# Patient Record
Sex: MALE | Race: WHITE | NOT HISPANIC OR LATINO | Employment: FULL TIME | URBAN - METROPOLITAN AREA
[De-identification: names, ages, dates, MRNs, and addresses within clinical notes are randomized per-mention and may not be internally consistent; named-entity substitution may affect disease eponyms.]

---

## 2017-09-08 ENCOUNTER — HISTORICAL (OUTPATIENT)
Dept: INTERNAL MEDICINE | Facility: CLINIC | Age: 37
End: 2017-09-08

## 2017-09-08 LAB
ABS NEUT (OLG): 5.38 X10(3)/MCL (ref 2.1–9.2)
ALBUMIN SERPL-MCNC: 3.9 GM/DL (ref 3.4–5)
ALBUMIN/GLOB SERPL: 1 RATIO (ref 1–2)
ALP SERPL-CCNC: 74 UNIT/L (ref 45–117)
ALT SERPL-CCNC: 42 UNIT/L (ref 12–78)
APPEARANCE, UA: CLEAR
AST SERPL-CCNC: 25 UNIT/L (ref 15–37)
BACTERIA #/AREA URNS AUTO: ABNORMAL /[HPF]
BASOPHILS # BLD AUTO: 0.07 X10(3)/MCL
BASOPHILS NFR BLD AUTO: 1 % (ref 0–1)
BILIRUB SERPL-MCNC: 0.5 MG/DL (ref 0.2–1)
BILIRUB UR QL STRIP: NEGATIVE
BILIRUBIN DIRECT+TOT PNL SERPL-MCNC: 0.1 MG/DL
BILIRUBIN DIRECT+TOT PNL SERPL-MCNC: 0.4 MG/DL
BUN SERPL-MCNC: 16 MG/DL (ref 7–18)
CALCIUM SERPL-MCNC: 9 MG/DL (ref 8.5–10.1)
CHLORIDE SERPL-SCNC: 102 MMOL/L (ref 98–107)
CHOLEST SERPL-MCNC: 179 MG/DL
CHOLEST/HDLC SERPL: 3.9 {RATIO} (ref 0–5)
CO2 SERPL-SCNC: 33 MMOL/L (ref 21–32)
COLOR UR: YELLOW
CREAT SERPL-MCNC: 0.8 MG/DL (ref 0.6–1.3)
EOSINOPHIL # BLD AUTO: 0.14 X10(3)/MCL
EOSINOPHIL NFR BLD AUTO: 2 % (ref 0–5)
ERYTHROCYTE [DISTWIDTH] IN BLOOD BY AUTOMATED COUNT: 14.2 % (ref 11.5–14.5)
EST. AVERAGE GLUCOSE BLD GHB EST-MCNC: 169 MG/DL
GLOBULIN SER-MCNC: 4.3 GM/ML (ref 2.3–3.5)
GLUCOSE (UA): NORMAL
GLUCOSE SERPL-MCNC: 123 MG/DL (ref 74–106)
HBA1C MFR BLD: 7.5 % (ref 4.2–6.3)
HCT VFR BLD AUTO: 46.5 % (ref 40–51)
HDLC SERPL-MCNC: 46 MG/DL
HGB BLD-MCNC: 15.1 GM/DL (ref 13.5–17.5)
HGB UR QL STRIP: NEGATIVE
HYALINE CASTS #/AREA URNS LPF: ABNORMAL /[LPF]
IMM GRANULOCYTES # BLD AUTO: 0.05 10*3/UL
IMM GRANULOCYTES NFR BLD AUTO: 0 %
KETONES UR QL STRIP: NEGATIVE
LDLC SERPL CALC-MCNC: 109 MG/DL (ref 0–130)
LEUKOCYTE ESTERASE UR QL STRIP: NEGATIVE
LYMPHOCYTES # BLD AUTO: 2.51 X10(3)/MCL
LYMPHOCYTES NFR BLD AUTO: 27 % (ref 15–40)
MCH RBC QN AUTO: 29.1 PG (ref 26–34)
MCHC RBC AUTO-ENTMCNC: 32.5 GM/DL (ref 31–37)
MCV RBC AUTO: 89.6 FL (ref 80–100)
MONOCYTES # BLD AUTO: 1.09 X10(3)/MCL
MONOCYTES NFR BLD AUTO: 12 % (ref 4–12)
NEUTROPHILS # BLD AUTO: 5.38 X10(3)/MCL
NEUTROPHILS NFR BLD AUTO: 58 X10(3)/MCL
NITRITE UR QL STRIP: NEGATIVE
PH UR STRIP: 7 [PH] (ref 4.5–8)
PLATELET # BLD AUTO: 443 X10(3)/MCL (ref 130–400)
PMV BLD AUTO: 9.2 FL (ref 7.4–10.4)
POTASSIUM SERPL-SCNC: 4 MMOL/L (ref 3.5–5.1)
PROT SERPL-MCNC: 8.2 GM/DL (ref 6.4–8.2)
PROT UR QL STRIP: 10 MG/DL
RBC # BLD AUTO: 5.19 X10(6)/MCL (ref 4.5–5.9)
RBC #/AREA URNS AUTO: ABNORMAL /[HPF]
SODIUM SERPL-SCNC: 141 MMOL/L (ref 136–145)
SP GR UR STRIP: 1.02 (ref 1–1.03)
SQUAMOUS #/AREA URNS LPF: ABNORMAL /[LPF]
TRIGL SERPL-MCNC: 122 MG/DL
TSH SERPL-ACNC: 1.46 MIU/L (ref 0.36–3.74)
UROBILINOGEN UR STRIP-ACNC: NORMAL
VLDLC SERPL CALC-MCNC: 24 MG/DL
WBC # SPEC AUTO: 9.2 X10(3)/MCL (ref 4.5–11)
WBC #/AREA URNS AUTO: ABNORMAL /HPF

## 2017-09-11 ENCOUNTER — HISTORICAL (OUTPATIENT)
Dept: INTERNAL MEDICINE | Facility: CLINIC | Age: 37
End: 2017-09-11

## 2017-09-11 LAB
EST. AVERAGE GLUCOSE BLD GHB EST-MCNC: 128 MG/DL
HAV IGM SERPL QL IA: NONREACTIVE
HBA1C MFR BLD: 6.1 % (ref 4.2–6.3)
HBV CORE IGM SERPL QL IA: NONREACTIVE
HBV SURFACE AG SERPL QL IA: NEGATIVE
HCV AB SERPL QL IA: NONREACTIVE
HIV 1+2 AB+HIV1 P24 AG SERPL QL IA: NONREACTIVE

## 2018-01-26 ENCOUNTER — HISTORICAL (OUTPATIENT)
Dept: INTERNAL MEDICINE | Facility: CLINIC | Age: 38
End: 2018-01-26

## 2018-01-26 LAB
ALBUMIN SERPL-MCNC: 4.1 GM/DL (ref 3.4–5)
ALBUMIN/GLOB SERPL: 1 RATIO (ref 1–2)
ALP SERPL-CCNC: 75 UNIT/L (ref 45–117)
ALT SERPL-CCNC: 49 UNIT/L (ref 12–78)
AST SERPL-CCNC: 27 UNIT/L (ref 15–37)
BILIRUB SERPL-MCNC: 0.5 MG/DL (ref 0.2–1)
BILIRUBIN DIRECT+TOT PNL SERPL-MCNC: 0.1 MG/DL
BILIRUBIN DIRECT+TOT PNL SERPL-MCNC: 0.4 MG/DL
BUN SERPL-MCNC: 16 MG/DL (ref 7–18)
CALCIUM SERPL-MCNC: 9.2 MG/DL (ref 8.5–10.1)
CHLORIDE SERPL-SCNC: 103 MMOL/L (ref 98–107)
CO2 SERPL-SCNC: 34 MMOL/L (ref 21–32)
CREAT SERPL-MCNC: 0.7 MG/DL (ref 0.6–1.3)
EST. AVERAGE GLUCOSE BLD GHB EST-MCNC: 140 MG/DL
GLOBULIN SER-MCNC: 4.1 GM/ML (ref 2.3–3.5)
GLUCOSE SERPL-MCNC: 120 MG/DL (ref 74–106)
HBA1C MFR BLD: 6.5 % (ref 4.2–6.3)
POTASSIUM SERPL-SCNC: 4.1 MMOL/L (ref 3.5–5.1)
PROT SERPL-MCNC: 8.2 GM/DL (ref 6.4–8.2)
SODIUM SERPL-SCNC: 142 MMOL/L (ref 136–145)

## 2018-04-25 ENCOUNTER — HISTORICAL (OUTPATIENT)
Dept: INTERNAL MEDICINE | Facility: CLINIC | Age: 38
End: 2018-04-25

## 2018-04-25 LAB
ALBUMIN SERPL-MCNC: 4 GM/DL (ref 3.4–5)
ALBUMIN/GLOB SERPL: 1 RATIO (ref 1–2)
ALP SERPL-CCNC: 86 UNIT/L (ref 45–117)
ALT SERPL-CCNC: 44 UNIT/L (ref 12–78)
AST SERPL-CCNC: 22 UNIT/L (ref 15–37)
BILIRUB SERPL-MCNC: 0.6 MG/DL (ref 0.2–1)
BILIRUBIN DIRECT+TOT PNL SERPL-MCNC: 0.2 MG/DL
BILIRUBIN DIRECT+TOT PNL SERPL-MCNC: 0.4 MG/DL
BUN SERPL-MCNC: 17 MG/DL (ref 7–18)
CALCIUM SERPL-MCNC: 9.2 MG/DL (ref 8.5–10.1)
CHLORIDE SERPL-SCNC: 104 MMOL/L (ref 98–107)
CO2 SERPL-SCNC: 32 MMOL/L (ref 21–32)
CREAT SERPL-MCNC: 0.8 MG/DL (ref 0.6–1.3)
CREAT UR-MCNC: 174 MG/DL
EST. AVERAGE GLUCOSE BLD GHB EST-MCNC: 140 MG/DL
GLOBULIN SER-MCNC: 4 GM/ML (ref 2.3–3.5)
GLUCOSE SERPL-MCNC: 118 MG/DL (ref 74–106)
HBA1C MFR BLD: 6.5 % (ref 4.2–6.3)
MICROALBUMIN UR-MCNC: 76.7 MG/L (ref 0–19)
MICROALBUMIN/CREAT RATIO PNL UR: 44.1 MCG/MG CR (ref 0–29)
POTASSIUM SERPL-SCNC: 4.3 MMOL/L (ref 3.5–5.1)
PROT SERPL-MCNC: 8 GM/DL (ref 6.4–8.2)
SODIUM SERPL-SCNC: 140 MMOL/L (ref 136–145)

## 2018-07-27 ENCOUNTER — HISTORICAL (OUTPATIENT)
Dept: INTERNAL MEDICINE | Facility: CLINIC | Age: 38
End: 2018-07-27

## 2018-07-27 LAB
ABS NEUT (OLG): 5.95 X10(3)/MCL (ref 2.1–9.2)
ALBUMIN SERPL-MCNC: 4.1 GM/DL (ref 3.4–5)
ALBUMIN/GLOB SERPL: 1 RATIO (ref 1–2)
ALP SERPL-CCNC: 79 UNIT/L (ref 45–117)
ALT SERPL-CCNC: 38 UNIT/L (ref 12–78)
APPEARANCE, UA: CLEAR
AST SERPL-CCNC: 18 UNIT/L (ref 15–37)
BACTERIA #/AREA URNS AUTO: ABNORMAL /[HPF]
BASOPHILS # BLD AUTO: 0.06 X10(3)/MCL
BASOPHILS NFR BLD AUTO: 1 %
BILIRUB SERPL-MCNC: 0.6 MG/DL (ref 0.2–1)
BILIRUB UR QL STRIP: NEGATIVE
BILIRUBIN DIRECT+TOT PNL SERPL-MCNC: 0.2 MG/DL
BILIRUBIN DIRECT+TOT PNL SERPL-MCNC: 0.4 MG/DL
BUN SERPL-MCNC: 15 MG/DL (ref 7–18)
CALCIUM SERPL-MCNC: 8.8 MG/DL (ref 8.5–10.1)
CHLORIDE SERPL-SCNC: 100 MMOL/L (ref 98–107)
CHOLEST SERPL-MCNC: 128 MG/DL
CHOLEST/HDLC SERPL: 3.2 {RATIO} (ref 0–5)
CO2 SERPL-SCNC: 32 MMOL/L (ref 21–32)
COLOR UR: YELLOW
CREAT SERPL-MCNC: 0.9 MG/DL (ref 0.6–1.3)
CREAT UR-MCNC: 153 MG/DL
EOSINOPHIL # BLD AUTO: 0.09 10*3/UL
EOSINOPHIL NFR BLD AUTO: 1 %
ERYTHROCYTE [DISTWIDTH] IN BLOOD BY AUTOMATED COUNT: 14.1 % (ref 11.5–14.5)
EST. AVERAGE GLUCOSE BLD GHB EST-MCNC: 137 MG/DL
GLOBULIN SER-MCNC: 3.8 GM/ML (ref 2.3–3.5)
GLUCOSE (UA): NORMAL
GLUCOSE SERPL-MCNC: 123 MG/DL (ref 74–106)
HBA1C MFR BLD: 6.4 % (ref 4.2–6.3)
HCT VFR BLD AUTO: 46.2 % (ref 40–51)
HDLC SERPL-MCNC: 40 MG/DL
HGB BLD-MCNC: 15 GM/DL (ref 13.5–17.5)
HGB UR QL STRIP: NEGATIVE
HIV 1+2 AB+HIV1 P24 AG SERPL QL IA: NONREACTIVE
HYALINE CASTS #/AREA URNS LPF: ABNORMAL /[LPF]
IMM GRANULOCYTES # BLD AUTO: 0.03 10*3/UL
IMM GRANULOCYTES NFR BLD AUTO: 0 %
KETONES UR QL STRIP: NEGATIVE
LDLC SERPL CALC-MCNC: 66 MG/DL (ref 0–130)
LEUKOCYTE ESTERASE UR QL STRIP: NEGATIVE
LYMPHOCYTES # BLD AUTO: 2.22 X10(3)/MCL
LYMPHOCYTES NFR BLD AUTO: 24 % (ref 13–40)
MCH RBC QN AUTO: 29.8 PG (ref 26–34)
MCHC RBC AUTO-ENTMCNC: 32.5 GM/DL (ref 31–37)
MCV RBC AUTO: 91.8 FL (ref 80–100)
MICROALBUMIN UR-MCNC: 31 MG/L (ref 0–19)
MICROALBUMIN/CREAT RATIO PNL UR: 20.3 MCG/MG CR (ref 0–29)
MONOCYTES # BLD AUTO: 1.01 X10(3)/MCL
MONOCYTES NFR BLD AUTO: 11 % (ref 4–12)
NEUTROPHILS # BLD AUTO: 5.95 X10(3)/MCL
NEUTROPHILS NFR BLD AUTO: 64 X10(3)/MCL
NITRITE UR QL STRIP: NEGATIVE
PH UR STRIP: 8.5 [PH] (ref 4.5–8)
PLATELET # BLD AUTO: 408 X10(3)/MCL (ref 130–400)
PMV BLD AUTO: 9.3 FL (ref 7.4–10.4)
POTASSIUM SERPL-SCNC: 4.4 MMOL/L (ref 3.5–5.1)
PROT SERPL-MCNC: 7.9 GM/DL (ref 6.4–8.2)
PROT UR QL STRIP: 20 MG/DL
RBC # BLD AUTO: 5.03 X10(6)/MCL (ref 4.5–5.9)
RBC #/AREA URNS AUTO: ABNORMAL /[HPF]
SODIUM SERPL-SCNC: 138 MMOL/L (ref 136–145)
SP GR UR STRIP: 1.02 (ref 1–1.03)
SQUAMOUS #/AREA URNS LPF: ABNORMAL /[LPF]
TRIGL SERPL-MCNC: 112 MG/DL
TSH SERPL-ACNC: 1.31 MIU/L (ref 0.36–3.74)
UROBILINOGEN UR STRIP-ACNC: NORMAL
VLDLC SERPL CALC-MCNC: 22 MG/DL
WBC # SPEC AUTO: 9.4 X10(3)/MCL (ref 4.5–11)
WBC #/AREA URNS AUTO: ABNORMAL /HPF

## 2018-11-20 ENCOUNTER — HISTORICAL (OUTPATIENT)
Dept: ADMINISTRATIVE | Facility: HOSPITAL | Age: 38
End: 2018-11-20

## 2018-11-20 LAB
ABS NEUT (OLG): 6.33 X10(3)/MCL (ref 2.1–9.2)
BASOPHILS # BLD AUTO: 0.07 X10(3)/MCL
BASOPHILS NFR BLD AUTO: 1 %
BUN SERPL-MCNC: 18 MG/DL (ref 7–18)
CALCIUM SERPL-MCNC: 8.9 MG/DL (ref 8.5–10.1)
CHLORIDE SERPL-SCNC: 102 MMOL/L (ref 98–107)
CO2 SERPL-SCNC: 32 MMOL/L (ref 21–32)
CREAT SERPL-MCNC: 0.8 MG/DL (ref 0.6–1.3)
CREAT/UREA NIT SERPL: 22
EOSINOPHIL # BLD AUTO: 0.1 X10(3)/MCL
EOSINOPHIL NFR BLD AUTO: 1 %
ERYTHROCYTE [DISTWIDTH] IN BLOOD BY AUTOMATED COUNT: 14.2 % (ref 11.5–14.5)
EST. AVERAGE GLUCOSE BLD GHB EST-MCNC: 134 MG/DL
GLUCOSE SERPL-MCNC: 83 MG/DL (ref 74–106)
HBA1C MFR BLD: 6.3 % (ref 4.2–6.3)
HCT VFR BLD AUTO: 46.5 % (ref 40–51)
HGB BLD-MCNC: 15 GM/DL (ref 13.5–17.5)
IMM GRANULOCYTES # BLD AUTO: 0.04 10*3/UL
IMM GRANULOCYTES NFR BLD AUTO: 0 %
LYMPHOCYTES # BLD AUTO: 2.69 X10(3)/MCL
LYMPHOCYTES NFR BLD AUTO: 26 % (ref 13–40)
MCH RBC QN AUTO: 29.8 PG (ref 26–34)
MCHC RBC AUTO-ENTMCNC: 32.3 GM/DL (ref 31–37)
MCV RBC AUTO: 92.3 FL (ref 80–100)
MONOCYTES # BLD AUTO: 1.12 X10(3)/MCL
MONOCYTES NFR BLD AUTO: 11 % (ref 4–12)
NEUTROPHILS # BLD AUTO: 6.33 X10(3)/MCL
NEUTROPHILS NFR BLD AUTO: 61 X10(3)/MCL
PLATELET # BLD AUTO: 430 X10(3)/MCL (ref 130–400)
PMV BLD AUTO: 9.2 FL (ref 7.4–10.4)
POTASSIUM SERPL-SCNC: 4.2 MMOL/L (ref 3.5–5.1)
RBC # BLD AUTO: 5.04 X10(6)/MCL (ref 4.5–5.9)
SODIUM SERPL-SCNC: 140 MMOL/L (ref 136–145)
WBC # SPEC AUTO: 10.4 X10(3)/MCL (ref 4.5–11)

## 2019-02-22 ENCOUNTER — HISTORICAL (OUTPATIENT)
Dept: INTERNAL MEDICINE | Facility: CLINIC | Age: 39
End: 2019-02-22

## 2019-02-22 LAB
ABS NEUT (OLG): 5.78 X10(3)/MCL (ref 2.1–9.2)
ALBUMIN SERPL-MCNC: 4.1 GM/DL (ref 3.4–5)
ALBUMIN/GLOB SERPL: 1.1 RATIO (ref 1.1–2)
ALP SERPL-CCNC: 71 UNIT/L (ref 45–117)
ALT SERPL-CCNC: 49 UNIT/L (ref 12–78)
APPEARANCE, UA: CLEAR
AST SERPL-CCNC: 23 UNIT/L (ref 15–37)
BACTERIA #/AREA URNS AUTO: ABNORMAL /[HPF]
BASOPHILS # BLD AUTO: 0.06 X10(3)/MCL
BASOPHILS NFR BLD AUTO: 1 %
BILIRUB SERPL-MCNC: 0.7 MG/DL (ref 0.2–1)
BILIRUB UR QL STRIP: ABNORMAL MG/DL
BILIRUBIN DIRECT+TOT PNL SERPL-MCNC: 0.2 MG/DL
BILIRUBIN DIRECT+TOT PNL SERPL-MCNC: 0.5 MG/DL
BUN SERPL-MCNC: 16 MG/DL (ref 7–18)
CALCIUM SERPL-MCNC: 9.2 MG/DL (ref 8.5–10.1)
CHLORIDE SERPL-SCNC: 102 MMOL/L (ref 98–107)
CHOLEST SERPL-MCNC: 142 MG/DL
CHOLEST/HDLC SERPL: 3 {RATIO} (ref 0–5)
CO2 SERPL-SCNC: 33 MMOL/L (ref 21–32)
COLOR UR: ABNORMAL
CREAT SERPL-MCNC: 0.8 MG/DL (ref 0.6–1.3)
CREAT UR-MCNC: 109 MG/DL
EOSINOPHIL # BLD AUTO: 0.1 10*3/UL
EOSINOPHIL NFR BLD AUTO: 1 %
ERYTHROCYTE [DISTWIDTH] IN BLOOD BY AUTOMATED COUNT: 14 % (ref 11.5–14.5)
EST. AVERAGE GLUCOSE BLD GHB EST-MCNC: 140 MG/DL
GLOBULIN SER-MCNC: 3.9 GM/ML (ref 2.3–3.5)
GLUCOSE (UA): ABNORMAL MG/DL
GLUCOSE SERPL-MCNC: 108 MG/DL (ref 74–106)
HBA1C MFR BLD: 6.5 % (ref 4.2–6.3)
HCT VFR BLD AUTO: 47.2 % (ref 40–51)
HDLC SERPL-MCNC: 47 MG/DL
HGB BLD-MCNC: 15 GM/DL (ref 13.5–17.5)
HGB UR QL STRIP: ABNORMAL MG/DL
HYALINE CASTS #/AREA URNS LPF: ABNORMAL /[LPF]
IMM GRANULOCYTES # BLD AUTO: 0.03 10*3/UL
IMM GRANULOCYTES NFR BLD AUTO: 0 %
KETONES UR QL STRIP: ABNORMAL MG/DL
LDLC SERPL CALC-MCNC: 77 MG/DL (ref 0–130)
LEUKOCYTE ESTERASE UR QL STRIP: ABNORMAL LEU/UL
LYMPHOCYTES # BLD AUTO: 2.73 X10(3)/MCL
LYMPHOCYTES NFR BLD AUTO: 28 % (ref 13–40)
MCH RBC QN AUTO: 29.4 PG (ref 26–34)
MCHC RBC AUTO-ENTMCNC: 31.8 GM/DL (ref 31–37)
MCV RBC AUTO: 92.5 FL (ref 80–100)
MICROALBUMIN UR-MCNC: 32.3 MG/L (ref 0–19)
MICROALBUMIN/CREAT RATIO PNL UR: 29.6 MCG/MG CR (ref 0–29)
MONOCYTES # BLD AUTO: 1.08 X10(3)/MCL
MONOCYTES NFR BLD AUTO: 11 % (ref 4–12)
NEUTROPHILS # BLD AUTO: 5.78 X10(3)/MCL
NEUTROPHILS NFR BLD AUTO: 59 X10(3)/MCL
NITRITE UR QL STRIP: ABNORMAL
PH UR STRIP: 8.5 [PH] (ref 4.5–8)
PLATELET # BLD AUTO: 423 X10(3)/MCL (ref 130–400)
PMV BLD AUTO: 9 FL (ref 7.4–10.4)
POTASSIUM SERPL-SCNC: 4.4 MMOL/L (ref 3.5–5.1)
PROT SERPL-MCNC: 8 GM/DL (ref 6.4–8.2)
PROT UR QL STRIP: 10 MG/DL
RBC # BLD AUTO: 5.1 X10(6)/MCL (ref 4.5–5.9)
RBC #/AREA URNS AUTO: ABNORMAL /[HPF]
SODIUM SERPL-SCNC: 138 MMOL/L (ref 136–145)
SP GR UR STRIP: 1.02 (ref 1–1.03)
SQUAMOUS #/AREA URNS LPF: ABNORMAL /[LPF]
TRIGL SERPL-MCNC: 90 MG/DL
TSH SERPL-ACNC: 0.91 MIU/L (ref 0.36–3.74)
UROBILINOGEN UR STRIP-ACNC: NORMAL MG/DL
VLDLC SERPL CALC-MCNC: 18 MG/DL
WBC # SPEC AUTO: 9.8 X10(3)/MCL (ref 4.5–11)
WBC #/AREA URNS AUTO: ABNORMAL /HPF

## 2019-02-25 ENCOUNTER — HISTORICAL (OUTPATIENT)
Dept: ADMINISTRATIVE | Facility: HOSPITAL | Age: 39
End: 2019-02-25

## 2019-03-20 ENCOUNTER — HISTORICAL (OUTPATIENT)
Dept: ADMINISTRATIVE | Facility: HOSPITAL | Age: 39
End: 2019-03-20

## 2019-06-21 ENCOUNTER — HISTORICAL (OUTPATIENT)
Dept: INTERNAL MEDICINE | Facility: CLINIC | Age: 39
End: 2019-06-21

## 2019-06-21 LAB
ABS NEUT (OLG): 6.06 X10(3)/MCL
BASOPHILS # BLD AUTO: 0.06 X10(3)/MCL
BASOPHILS NFR BLD AUTO: 1 %
BUN SERPL-MCNC: 18 MG/DL (ref 7–18)
CALCIUM SERPL-MCNC: 8.9 MG/DL (ref 8.5–10.1)
CHLORIDE SERPL-SCNC: 103 MMOL/L (ref 98–107)
CHOLEST SERPL-MCNC: 154 MG/DL
CHOLEST/HDLC SERPL: 3.4 {RATIO} (ref 0–5)
CO2 SERPL-SCNC: 31 MMOL/L (ref 21–32)
CREAT SERPL-MCNC: 0.8 MG/DL (ref 0.6–1.3)
CREAT/UREA NIT SERPL: 22
EOSINOPHIL # BLD AUTO: 0.14 X10(3)/MCL
EOSINOPHIL NFR BLD AUTO: 2 %
ERYTHROCYTE [DISTWIDTH] IN BLOOD BY AUTOMATED COUNT: 13.9 % (ref 11.5–14.5)
EST. AVERAGE GLUCOSE BLD GHB EST-MCNC: 137 MG/DL
GLUCOSE SERPL-MCNC: 124 MG/DL (ref 74–106)
HBA1C MFR BLD: 6.4 % (ref 4.2–6.3)
HCT VFR BLD AUTO: 48.6 % (ref 40–51)
HDLC SERPL-MCNC: 45 MG/DL
HGB BLD-MCNC: 15.4 GM/DL (ref 13.5–17.5)
HIV 1+2 AB+HIV1 P24 AG SERPL QL IA: NONREACTIVE
IMM GRANULOCYTES # BLD AUTO: 0.06 10*3/UL
IMM GRANULOCYTES NFR BLD AUTO: 1 %
LDLC SERPL CALC-MCNC: 95 MG/DL (ref 0–130)
LYMPHOCYTES # BLD AUTO: 2.1 X10(3)/MCL
LYMPHOCYTES NFR BLD AUTO: 22 % (ref 13–40)
MACROCYTES BLD QL SMEAR: NORMAL
MCH RBC QN AUTO: 29.9 PG (ref 26–34)
MCHC RBC AUTO-ENTMCNC: 31.7 GM/DL (ref 31–37)
MCV RBC AUTO: 94.4 FL (ref 80–100)
MONOCYTES # BLD AUTO: 1.05 X10(3)/MCL
MONOCYTES NFR BLD AUTO: 11 % (ref 4–12)
NEUTROPHILS # BLD AUTO: 6.06 X10(3)/MCL
NEUTROPHILS NFR BLD AUTO: 64 %
PLATELET # BLD AUTO: 372 X10(3)/MCL (ref 130–400)
PLATELET # BLD EST: ADEQUATE 10*3/UL
PMV BLD AUTO: 10.3 FL (ref 7.4–10.4)
POIKILOCYTOSIS BLD QL SMEAR: NORMAL
POLYCHROMASIA BLD QL SMEAR: NORMAL
POTASSIUM SERPL-SCNC: 4.3 MMOL/L (ref 3.5–5.1)
RBC # BLD AUTO: 5.15 X10(6)/MCL (ref 4.5–5.9)
RBC MORPH BLD: NORMAL
SODIUM SERPL-SCNC: 137 MMOL/L (ref 136–145)
TRIGL SERPL-MCNC: 72 MG/DL
VLDLC SERPL CALC-MCNC: 14 MG/DL
WBC # SPEC AUTO: 9.5 X10(3)/MCL (ref 4.5–11)

## 2019-06-24 ENCOUNTER — HISTORICAL (OUTPATIENT)
Dept: ADMINISTRATIVE | Facility: HOSPITAL | Age: 39
End: 2019-06-24

## 2019-12-06 ENCOUNTER — HISTORICAL (OUTPATIENT)
Dept: INTERNAL MEDICINE | Facility: CLINIC | Age: 39
End: 2019-12-06

## 2019-12-06 LAB
ABS NEUT (OLG): 5.18 X10(3)/MCL (ref 2.1–9.2)
BASOPHILS # BLD AUTO: 0.1 X10(3)/MCL (ref 0–0.2)
BASOPHILS NFR BLD AUTO: 1 %
BUN SERPL-MCNC: 13 MG/DL (ref 7–18)
CALCIUM SERPL-MCNC: 9.3 MG/DL (ref 8.5–10.1)
CHLORIDE SERPL-SCNC: 104 MMOL/L (ref 98–107)
CO2 SERPL-SCNC: 34 MMOL/L (ref 21–32)
CREAT SERPL-MCNC: 0.7 MG/DL (ref 0.6–1.3)
CREAT/UREA NIT SERPL: 19
EOSINOPHIL # BLD AUTO: 0.1 X10(3)/MCL (ref 0–0.9)
EOSINOPHIL NFR BLD AUTO: 1 %
ERYTHROCYTE [DISTWIDTH] IN BLOOD BY AUTOMATED COUNT: 13.8 % (ref 11.5–14.5)
EST. AVERAGE GLUCOSE BLD GHB EST-MCNC: 143 MG/DL
GLUCOSE SERPL-MCNC: 124 MG/DL (ref 74–106)
HBA1C MFR BLD: 6.6 % (ref 4.2–6.3)
HCT VFR BLD AUTO: 48.3 % (ref 40–51)
HGB BLD-MCNC: 14.9 GM/DL (ref 13.5–17.5)
IMM GRANULOCYTES # BLD AUTO: 0.03 10*3/UL
IMM GRANULOCYTES NFR BLD AUTO: 0 %
LYMPHOCYTES # BLD AUTO: 2.2 X10(3)/MCL (ref 0.6–4.6)
LYMPHOCYTES NFR BLD AUTO: 26 %
MCH RBC QN AUTO: 29.1 PG (ref 26–34)
MCHC RBC AUTO-ENTMCNC: 30.8 GM/DL (ref 31–37)
MCV RBC AUTO: 94.3 FL (ref 80–100)
MONOCYTES # BLD AUTO: 1.1 X10(3)/MCL (ref 0.1–1.3)
MONOCYTES NFR BLD AUTO: 13 %
NEUTROPHILS # BLD AUTO: 5.18 X10(3)/MCL (ref 2.1–9.2)
NEUTROPHILS NFR BLD AUTO: 59 %
PLATELET # BLD AUTO: 397 X10(3)/MCL (ref 130–400)
PMV BLD AUTO: 9.4 FL (ref 7.4–10.4)
POTASSIUM SERPL-SCNC: 4.4 MMOL/L (ref 3.5–5.1)
RBC # BLD AUTO: 5.12 X10(6)/MCL (ref 4.5–5.9)
SODIUM SERPL-SCNC: 140 MMOL/L (ref 136–145)
WBC # SPEC AUTO: 8.8 X10(3)/MCL (ref 4.5–11)

## 2019-12-18 ENCOUNTER — HISTORICAL (OUTPATIENT)
Dept: ADMINISTRATIVE | Facility: HOSPITAL | Age: 39
End: 2019-12-18

## 2020-02-12 ENCOUNTER — HISTORICAL (OUTPATIENT)
Dept: ADMINISTRATIVE | Facility: HOSPITAL | Age: 40
End: 2020-02-12

## 2020-06-09 ENCOUNTER — HISTORICAL (OUTPATIENT)
Dept: INTERNAL MEDICINE | Facility: CLINIC | Age: 40
End: 2020-06-09

## 2020-06-09 LAB
ABS NEUT (OLG): 5.85 X10(3)/MCL (ref 2.1–9.2)
ALBUMIN SERPL-MCNC: 3.7 GM/DL (ref 3.4–5)
ALBUMIN/GLOB SERPL: 0.9 RATIO (ref 1.1–2)
ALP SERPL-CCNC: 81 UNIT/L (ref 45–117)
ALT SERPL-CCNC: 34 UNIT/L (ref 12–78)
AST SERPL-CCNC: 16 UNIT/L (ref 15–37)
BASOPHILS # BLD AUTO: 0.1 X10(3)/MCL (ref 0–0.2)
BASOPHILS NFR BLD AUTO: 1 %
BILIRUB SERPL-MCNC: 0.3 MG/DL (ref 0.2–1)
BILIRUBIN DIRECT+TOT PNL SERPL-MCNC: <0.1 MG/DL (ref 0–0.2)
BILIRUBIN DIRECT+TOT PNL SERPL-MCNC: ABNORMAL MG/DL
BUN SERPL-MCNC: 15 MG/DL (ref 7–18)
CALCIUM SERPL-MCNC: 8.8 MG/DL (ref 8.5–10.1)
CHLORIDE SERPL-SCNC: 107 MMOL/L (ref 98–107)
CHOLEST SERPL-MCNC: 176 MG/DL
CHOLEST/HDLC SERPL: 4.3 {RATIO} (ref 0–5)
CO2 SERPL-SCNC: 30 MMOL/L (ref 21–32)
CREAT SERPL-MCNC: 0.7 MG/DL (ref 0.6–1.3)
CREAT UR-MCNC: 121 MG/DL
EOSINOPHIL # BLD AUTO: 0.4 X10(3)/MCL (ref 0–0.9)
EOSINOPHIL NFR BLD AUTO: 4 %
ERYTHROCYTE [DISTWIDTH] IN BLOOD BY AUTOMATED COUNT: 14 % (ref 11.5–14.5)
EST. AVERAGE GLUCOSE BLD GHB EST-MCNC: 140 MG/DL
GLOBULIN SER-MCNC: 4.2 GM/ML (ref 2.3–3.5)
GLUCOSE SERPL-MCNC: 136 MG/DL (ref 74–106)
HBA1C MFR BLD: 6.5 % (ref 4.2–6.3)
HCT VFR BLD AUTO: 46 % (ref 40–51)
HDLC SERPL-MCNC: 41 MG/DL (ref 40–59)
HGB BLD-MCNC: 14.4 GM/DL (ref 13.5–17.5)
IMM GRANULOCYTES # BLD AUTO: 0.02 10*3/UL
IMM GRANULOCYTES NFR BLD AUTO: 0 %
LDLC SERPL CALC-MCNC: 122 MG/DL
LYMPHOCYTES # BLD AUTO: 2.4 X10(3)/MCL (ref 0.6–4.6)
LYMPHOCYTES NFR BLD AUTO: 24 %
MCH RBC QN AUTO: 29.3 PG (ref 26–34)
MCHC RBC AUTO-ENTMCNC: 31.3 GM/DL (ref 31–37)
MCV RBC AUTO: 93.7 FL (ref 80–100)
MICROALBUMIN UR-MCNC: 29.7 MG/L (ref 0–19)
MICROALBUMIN/CREAT RATIO PNL UR: 24.5 MCG/MG CR (ref 0–29)
MONOCYTES # BLD AUTO: 1.1 X10(3)/MCL (ref 0.1–1.3)
MONOCYTES NFR BLD AUTO: 11 %
NEUTROPHILS # BLD AUTO: 5.85 X10(3)/MCL (ref 2.1–9.2)
NEUTROPHILS NFR BLD AUTO: 60 %
PLATELET # BLD AUTO: 409 X10(3)/MCL (ref 130–400)
PMV BLD AUTO: 9 FL (ref 7.4–10.4)
POTASSIUM SERPL-SCNC: 4.5 MMOL/L (ref 3.5–5.1)
PROT SERPL-MCNC: 7.9 GM/DL (ref 6.4–8.2)
RBC # BLD AUTO: 4.91 X10(6)/MCL (ref 4.5–5.9)
SODIUM SERPL-SCNC: 140 MMOL/L (ref 136–145)
TRIGL SERPL-MCNC: 65 MG/DL
TSH SERPL-ACNC: 1.24 MIU/L (ref 0.36–3.74)
VLDLC SERPL CALC-MCNC: 13 MG/DL
WBC # SPEC AUTO: 9.8 X10(3)/MCL (ref 4.5–11)

## 2020-07-02 ENCOUNTER — HISTORICAL (OUTPATIENT)
Dept: ADMINISTRATIVE | Facility: HOSPITAL | Age: 40
End: 2020-07-02

## 2020-07-04 LAB — FINAL CULTURE: NORMAL

## 2020-12-11 ENCOUNTER — HISTORICAL (OUTPATIENT)
Dept: INTERNAL MEDICINE | Facility: CLINIC | Age: 40
End: 2020-12-11

## 2020-12-11 LAB
APPEARANCE, UA: CLEAR
BACTERIA #/AREA URNS AUTO: ABNORMAL /HPF
BILIRUB UR QL STRIP: NEGATIVE
CHOLEST SERPL-MCNC: 160 MG/DL
CHOLEST/HDLC SERPL: 4 {RATIO} (ref 0–5)
COLOR UR: ABNORMAL
EST. AVERAGE GLUCOSE BLD GHB EST-MCNC: 162.8 MG/DL
GLUCOSE (UA): NEGATIVE
HBA1C MFR BLD: 7.3 %
HDLC SERPL-MCNC: 40 MG/DL (ref 35–60)
HGB UR QL STRIP: NEGATIVE
HYALINE CASTS #/AREA URNS LPF: ABNORMAL /LPF
KETONES UR QL STRIP: NEGATIVE
LDLC SERPL CALC-MCNC: 105 MG/DL (ref 50–140)
LEUKOCYTE ESTERASE UR QL STRIP: NEGATIVE
NITRITE UR QL STRIP: NEGATIVE
PH UR STRIP: 8 [PH] (ref 4.5–8)
PROT UR QL STRIP: 10 MG/DL
RBC #/AREA URNS AUTO: ABNORMAL /HPF
SP GR UR STRIP: 1.02 (ref 1–1.03)
SQUAMOUS #/AREA URNS LPF: ABNORMAL /LPF
TRIGL SERPL-MCNC: 76 MG/DL (ref 34–140)
UROBILINOGEN UR STRIP-ACNC: NORMAL
VLDLC SERPL CALC-MCNC: 15 MG/DL
WBC #/AREA URNS AUTO: ABNORMAL /HPF

## 2020-12-18 ENCOUNTER — HISTORICAL (OUTPATIENT)
Dept: ADMINISTRATIVE | Facility: HOSPITAL | Age: 40
End: 2020-12-18

## 2020-12-18 LAB
ABS NEUT (OLG): 6.35 X10(3)/MCL (ref 2.1–9.2)
ALBUMIN SERPL-MCNC: 4.3 GM/DL (ref 3.5–5)
ALBUMIN/GLOB SERPL: 1.3 RATIO (ref 1.1–2)
ALP SERPL-CCNC: 69 UNIT/L (ref 40–150)
ALT SERPL-CCNC: 37 UNIT/L (ref 0–55)
AST SERPL-CCNC: 22 UNIT/L (ref 5–34)
BASOPHILS # BLD AUTO: 0.1 X10(3)/MCL (ref 0–0.2)
BASOPHILS NFR BLD AUTO: 1 %
BILIRUB SERPL-MCNC: 0.6 MG/DL
BILIRUBIN DIRECT+TOT PNL SERPL-MCNC: 0.2 MG/DL (ref 0–0.5)
BILIRUBIN DIRECT+TOT PNL SERPL-MCNC: 0.4 MG/DL (ref 0–0.8)
BUN SERPL-MCNC: 12 MG/DL (ref 8.9–20.6)
CALCIUM SERPL-MCNC: 9.4 MG/DL (ref 8.4–10.2)
CHLORIDE SERPL-SCNC: 99 MMOL/L (ref 98–107)
CO2 SERPL-SCNC: 30 MMOL/L (ref 22–29)
CREAT SERPL-MCNC: 0.71 MG/DL (ref 0.73–1.18)
EOSINOPHIL # BLD AUTO: 0.1 X10(3)/MCL (ref 0–0.9)
EOSINOPHIL NFR BLD AUTO: 1 %
ERYTHROCYTE [DISTWIDTH] IN BLOOD BY AUTOMATED COUNT: 13.4 % (ref 11.5–14.5)
GLOBULIN SER-MCNC: 3.4 GM/DL (ref 2.4–3.5)
GLUCOSE SERPL-MCNC: 128 MG/DL (ref 74–100)
HCT VFR BLD AUTO: 49.1 % (ref 40–51)
HGB BLD-MCNC: 15.5 GM/DL (ref 13.5–17.5)
IMM GRANULOCYTES # BLD AUTO: 0.05 10*3/UL
IMM GRANULOCYTES NFR BLD AUTO: 0 %
LYMPHOCYTES # BLD AUTO: 2.2 X10(3)/MCL (ref 0.6–4.6)
LYMPHOCYTES NFR BLD AUTO: 22 %
MCH RBC QN AUTO: 29 PG (ref 26–34)
MCHC RBC AUTO-ENTMCNC: 31.6 GM/DL (ref 31–37)
MCV RBC AUTO: 91.9 FL (ref 80–100)
MONOCYTES # BLD AUTO: 1 X10(3)/MCL (ref 0.1–1.3)
MONOCYTES NFR BLD AUTO: 10 %
NEUTROPHILS # BLD AUTO: 6.35 X10(3)/MCL (ref 2.1–9.2)
NEUTROPHILS NFR BLD AUTO: 66 %
PLATELET # BLD AUTO: 385 X10(3)/MCL (ref 130–400)
PMV BLD AUTO: 10.1 FL (ref 7.4–10.4)
POTASSIUM SERPL-SCNC: 4.8 MMOL/L (ref 3.5–5.1)
PROT SERPL-MCNC: 7.7 GM/DL (ref 6.4–8.3)
RBC # BLD AUTO: 5.34 X10(6)/MCL (ref 4.5–5.9)
SODIUM SERPL-SCNC: 139 MMOL/L (ref 136–145)
WBC # SPEC AUTO: 9.7 X10(3)/MCL (ref 4.5–11)

## 2021-03-28 ENCOUNTER — HISTORICAL (OUTPATIENT)
Dept: ADMINISTRATIVE | Facility: HOSPITAL | Age: 41
End: 2021-03-28

## 2021-06-11 ENCOUNTER — HISTORICAL (OUTPATIENT)
Dept: INTERNAL MEDICINE | Facility: CLINIC | Age: 41
End: 2021-06-11

## 2021-06-11 LAB
ABS NEUT (OLG): 8.27 X10(3)/MCL (ref 2.1–9.2)
ALBUMIN SERPL-MCNC: 4 GM/DL (ref 3.5–5)
ALBUMIN/GLOB SERPL: 1.1 RATIO (ref 1.1–2)
ALP SERPL-CCNC: 87 UNIT/L (ref 40–150)
ALT SERPL-CCNC: 55 UNIT/L (ref 0–55)
APPEARANCE, UA: CLEAR
AST SERPL-CCNC: 26 UNIT/L (ref 5–34)
BACTERIA #/AREA URNS AUTO: ABNORMAL /HPF
BASOPHILS # BLD AUTO: 0.1 X10(3)/MCL (ref 0–0.2)
BASOPHILS NFR BLD AUTO: 1 %
BILIRUB SERPL-MCNC: 0.5 MG/DL
BILIRUB UR QL STRIP: NEGATIVE
BILIRUBIN DIRECT+TOT PNL SERPL-MCNC: 0.2 MG/DL (ref 0–0.5)
BILIRUBIN DIRECT+TOT PNL SERPL-MCNC: 0.3 MG/DL (ref 0–0.8)
BUN SERPL-MCNC: 15.4 MG/DL (ref 8.9–20.6)
CALCIUM SERPL-MCNC: 9.6 MG/DL (ref 8.4–10.2)
CHLORIDE SERPL-SCNC: 99 MMOL/L (ref 98–107)
CHOLEST SERPL-MCNC: 138 MG/DL
CHOLEST/HDLC SERPL: 4 {RATIO} (ref 0–5)
CO2 SERPL-SCNC: 30 MMOL/L (ref 22–29)
COLOR UR: YELLOW
CREAT SERPL-MCNC: 0.77 MG/DL (ref 0.73–1.18)
CREAT UR-MCNC: 125.4 MG/DL (ref 58–161)
EOSINOPHIL # BLD AUTO: 0.1 X10(3)/MCL (ref 0–0.9)
EOSINOPHIL NFR BLD AUTO: 1 %
ERYTHROCYTE [DISTWIDTH] IN BLOOD BY AUTOMATED COUNT: 13.7 % (ref 11.5–14.5)
EST. AVERAGE GLUCOSE BLD GHB EST-MCNC: 185.8 MG/DL
GLOBULIN SER-MCNC: 3.7 GM/DL (ref 2.4–3.5)
GLUCOSE (UA): NEGATIVE
GLUCOSE SERPL-MCNC: 180 MG/DL (ref 74–100)
HBA1C MFR BLD: 8.1 %
HCT VFR BLD AUTO: 49.1 % (ref 40–51)
HDLC SERPL-MCNC: 38 MG/DL (ref 35–60)
HGB BLD-MCNC: 15.5 GM/DL (ref 13.5–17.5)
HGB UR QL STRIP: NEGATIVE
HYALINE CASTS #/AREA URNS LPF: ABNORMAL /LPF
IMM GRANULOCYTES # BLD AUTO: 0.08 10*3/UL
IMM GRANULOCYTES NFR BLD AUTO: 1 %
KETONES UR QL STRIP: NEGATIVE
LDLC SERPL CALC-MCNC: 78 MG/DL (ref 50–140)
LEUKOCYTE ESTERASE UR QL STRIP: NEGATIVE
LYMPHOCYTES # BLD AUTO: 2.7 X10(3)/MCL (ref 0.6–4.6)
LYMPHOCYTES NFR BLD AUTO: 22 %
MCH RBC QN AUTO: 28.7 PG (ref 26–34)
MCHC RBC AUTO-ENTMCNC: 31.6 GM/DL (ref 31–37)
MCV RBC AUTO: 90.9 FL (ref 80–100)
MICROALBUMIN UR-MCNC: 30.6 MG/L
MICROALBUMIN/CREAT RATIO PNL UR: 24.4 MG/GM CR (ref 0–30)
MONOCYTES # BLD AUTO: 0.9 X10(3)/MCL (ref 0.1–1.3)
MONOCYTES NFR BLD AUTO: 8 %
NEUTROPHILS # BLD AUTO: 8.27 X10(3)/MCL (ref 2.1–9.2)
NEUTROPHILS NFR BLD AUTO: 68 %
NITRITE UR QL STRIP: NEGATIVE
NRBC BLD AUTO-RTO: 0 % (ref 0–0.2)
PH UR STRIP: 7 [PH] (ref 4.5–8)
PLATELET # BLD AUTO: 452 X10(3)/MCL (ref 130–400)
PMV BLD AUTO: 9.3 FL (ref 7.4–10.4)
POTASSIUM SERPL-SCNC: 4.4 MMOL/L (ref 3.5–5.1)
PROT SERPL-MCNC: 7.7 GM/DL (ref 6.4–8.3)
PROT UR QL STRIP: NEGATIVE
RBC # BLD AUTO: 5.4 X10(6)/MCL (ref 4.5–5.9)
RBC #/AREA URNS AUTO: ABNORMAL /HPF
SODIUM SERPL-SCNC: 139 MMOL/L (ref 136–145)
SP GR UR STRIP: 1.02 (ref 1–1.03)
SQUAMOUS #/AREA URNS LPF: ABNORMAL /LPF
TRIGL SERPL-MCNC: 112 MG/DL (ref 34–140)
TSH SERPL-ACNC: 1.13 UIU/ML (ref 0.35–4.94)
UROBILINOGEN UR STRIP-ACNC: NORMAL
VLDLC SERPL CALC-MCNC: 22 MG/DL
WBC # SPEC AUTO: 12.2 X10(3)/MCL (ref 4.5–11)
WBC #/AREA URNS AUTO: ABNORMAL /HPF

## 2021-07-12 ENCOUNTER — HISTORICAL (OUTPATIENT)
Dept: ADMINISTRATIVE | Facility: HOSPITAL | Age: 41
End: 2021-07-12

## 2021-09-16 ENCOUNTER — HISTORICAL (OUTPATIENT)
Dept: INTERNAL MEDICINE | Facility: CLINIC | Age: 41
End: 2021-09-16

## 2021-09-16 LAB
ABS NEUT (OLG): 6.95 X10(3)/MCL (ref 2.1–9.2)
BASOPHILS # BLD AUTO: 0.1 X10(3)/MCL (ref 0–0.2)
BASOPHILS NFR BLD AUTO: 1 %
BUN SERPL-MCNC: 14.6 MG/DL (ref 8.9–20.6)
CALCIUM SERPL-MCNC: 9.6 MG/DL (ref 8.4–10.2)
CHLORIDE SERPL-SCNC: 99 MMOL/L (ref 98–107)
CO2 SERPL-SCNC: 34 MMOL/L (ref 22–29)
CREAT SERPL-MCNC: 0.84 MG/DL (ref 0.73–1.18)
CREAT/UREA NIT SERPL: 17
EOSINOPHIL # BLD AUTO: 0.1 X10(3)/MCL (ref 0–0.9)
EOSINOPHIL NFR BLD AUTO: 1 %
ERYTHROCYTE [DISTWIDTH] IN BLOOD BY AUTOMATED COUNT: 14.3 % (ref 11.5–14.5)
EST. AVERAGE GLUCOSE BLD GHB EST-MCNC: 217.3 MG/DL
GLUCOSE SERPL-MCNC: 225 MG/DL (ref 74–100)
HBA1C MFR BLD: 9.2 %
HCT VFR BLD AUTO: 48.7 % (ref 40–51)
HGB BLD-MCNC: 15.3 GM/DL (ref 13.5–17.5)
IMM GRANULOCYTES # BLD AUTO: 0.11 10*3/UL
IMM GRANULOCYTES NFR BLD AUTO: 1 %
LYMPHOCYTES # BLD AUTO: 2.8 X10(3)/MCL (ref 0.6–4.6)
LYMPHOCYTES NFR BLD AUTO: 25 %
MCH RBC QN AUTO: 29 PG (ref 26–34)
MCHC RBC AUTO-ENTMCNC: 31.4 GM/DL (ref 31–37)
MCV RBC AUTO: 92.2 FL (ref 80–100)
MONOCYTES # BLD AUTO: 1 X10(3)/MCL (ref 0.1–1.3)
MONOCYTES NFR BLD AUTO: 9 %
NEUTROPHILS # BLD AUTO: 6.95 X10(3)/MCL (ref 2.1–9.2)
NEUTROPHILS NFR BLD AUTO: 63 %
NRBC BLD AUTO-RTO: 0 % (ref 0–0.2)
PLATELET # BLD AUTO: 438 X10(3)/MCL (ref 130–400)
PMV BLD AUTO: 9.9 FL (ref 7.4–10.4)
POTASSIUM SERPL-SCNC: 4.3 MMOL/L (ref 3.5–5.1)
RBC # BLD AUTO: 5.28 X10(6)/MCL (ref 4.5–5.9)
SODIUM SERPL-SCNC: 140 MMOL/L (ref 136–145)
WBC # SPEC AUTO: 11 X10(3)/MCL (ref 4.5–11)

## 2021-12-20 ENCOUNTER — HISTORICAL (OUTPATIENT)
Dept: INTERNAL MEDICINE | Facility: CLINIC | Age: 41
End: 2021-12-20

## 2021-12-20 LAB
ABS NEUT (OLG): 6.02 X10(3)/MCL (ref 2.1–9.2)
ALBUMIN SERPL-MCNC: 4 GM/DL (ref 3.5–5)
ALBUMIN/GLOB SERPL: 1 RATIO (ref 1.1–2)
ALP SERPL-CCNC: 77 UNIT/L (ref 40–150)
ALT SERPL-CCNC: 53 UNIT/L (ref 0–55)
AST SERPL-CCNC: 29 UNIT/L (ref 5–34)
BASOPHILS # BLD AUTO: 0.1 X10(3)/MCL (ref 0–0.2)
BASOPHILS NFR BLD AUTO: 1 %
BILIRUB SERPL-MCNC: 0.6 MG/DL
BILIRUBIN DIRECT+TOT PNL SERPL-MCNC: 0.2 MG/DL (ref 0–0.5)
BILIRUBIN DIRECT+TOT PNL SERPL-MCNC: 0.4 MG/DL (ref 0–0.8)
BUN SERPL-MCNC: 14.1 MG/DL (ref 8.9–20.6)
CALCIUM SERPL-MCNC: 9.4 MG/DL (ref 8.7–10.5)
CHLORIDE SERPL-SCNC: 100 MMOL/L (ref 98–107)
CO2 SERPL-SCNC: 31 MMOL/L (ref 22–29)
CREAT SERPL-MCNC: 0.79 MG/DL (ref 0.73–1.18)
CREAT UR-MCNC: 186 MG/DL (ref 58–161)
EOSINOPHIL # BLD AUTO: 0.1 X10(3)/MCL (ref 0–0.9)
EOSINOPHIL NFR BLD AUTO: 1 %
ERYTHROCYTE [DISTWIDTH] IN BLOOD BY AUTOMATED COUNT: 13.9 % (ref 11.5–14.5)
EST. AVERAGE GLUCOSE BLD GHB EST-MCNC: 174.3 MG/DL
GLOBULIN SER-MCNC: 3.9 GM/DL (ref 2.4–3.5)
GLUCOSE SERPL-MCNC: 160 MG/DL (ref 74–100)
HBA1C MFR BLD: 7.7 %
HCT VFR BLD AUTO: 47.1 % (ref 40–51)
HGB BLD-MCNC: 15 GM/DL (ref 13.5–17.5)
IMM GRANULOCYTES # BLD AUTO: 0.03 10*3/UL
IMM GRANULOCYTES NFR BLD AUTO: 0 %
LYMPHOCYTES # BLD AUTO: 2.8 X10(3)/MCL (ref 0.6–4.6)
LYMPHOCYTES NFR BLD AUTO: 27 %
MCH RBC QN AUTO: 28.9 PG (ref 26–34)
MCHC RBC AUTO-ENTMCNC: 31.8 GM/DL (ref 31–37)
MCV RBC AUTO: 90.8 FL (ref 80–100)
MICROALBUMIN UR-MCNC: 31.1 MG/L
MICROALBUMIN/CREAT RATIO PNL UR: 16.7 MG/GM CR (ref 0–30)
MONOCYTES # BLD AUTO: 1.1 X10(3)/MCL (ref 0.1–1.3)
MONOCYTES NFR BLD AUTO: 11 %
NEUTROPHILS # BLD AUTO: 6.02 X10(3)/MCL (ref 2.1–9.2)
NEUTROPHILS NFR BLD AUTO: 60 %
NRBC BLD AUTO-RTO: 0 % (ref 0–0.2)
PLATELET # BLD AUTO: 447 X10(3)/MCL (ref 130–400)
PMV BLD AUTO: 9.6 FL (ref 7.4–10.4)
POTASSIUM SERPL-SCNC: 4.4 MMOL/L (ref 3.5–5.1)
PROT SERPL-MCNC: 7.9 GM/DL (ref 6.4–8.3)
RBC # BLD AUTO: 5.19 X10(6)/MCL (ref 4.5–5.9)
SODIUM SERPL-SCNC: 139 MMOL/L (ref 136–145)
WBC # SPEC AUTO: 10.1 X10(3)/MCL (ref 4.5–11)

## 2022-03-16 ENCOUNTER — HISTORICAL (OUTPATIENT)
Dept: INTERNAL MEDICINE | Facility: CLINIC | Age: 42
End: 2022-03-16

## 2022-03-16 LAB
ABS NEUT (OLG): 4.54 (ref 2.1–9.2)
ALBUMIN SERPL-MCNC: 4 G/DL (ref 3.5–5)
ALBUMIN/GLOB SERPL: 1.4 {RATIO} (ref 1.1–2)
ALP SERPL-CCNC: 70 U/L (ref 40–150)
ALT SERPL-CCNC: 45 U/L (ref 0–55)
AST SERPL-CCNC: 28 U/L (ref 5–34)
BASOPHILS # BLD AUTO: 0.1 10*3/UL (ref 0–0.2)
BASOPHILS NFR BLD AUTO: 1 %
BILIRUB SERPL-MCNC: 0.6 MG/DL
BILIRUBIN DIRECT+TOT PNL SERPL-MCNC: 0.3 (ref 0–0.5)
BILIRUBIN DIRECT+TOT PNL SERPL-MCNC: 0.3 (ref 0–0.8)
BUN SERPL-MCNC: 10.6 MG/DL (ref 8.9–20.6)
CALCIUM SERPL-MCNC: 9.3 MG/DL (ref 8.7–10.5)
CHLORIDE SERPL-SCNC: 100 MMOL/L (ref 98–107)
CO2 SERPL-SCNC: 29 MMOL/L (ref 22–29)
CREAT SERPL-MCNC: 0.75 MG/DL (ref 0.73–1.18)
EOSINOPHIL # BLD AUTO: 0.1 10*3/UL (ref 0–0.9)
EOSINOPHIL NFR BLD AUTO: 2 %
ERYTHROCYTE [DISTWIDTH] IN BLOOD BY AUTOMATED COUNT: 15.3 % (ref 11.5–14.5)
EST. AVERAGE GLUCOSE BLD GHB EST-MCNC: 157.1 MG/DL
FLAG2 (OHS): 60
FLAG3 (OHS): 80
FLAGS (OHS): 80
GLOBULIN SER-MCNC: 2.9 G/DL (ref 2.4–3.5)
GLUCOSE SERPL-MCNC: 149 MG/DL (ref 74–100)
HBA1C MFR BLD: 7.1 %
HCT VFR BLD AUTO: 47.2 % (ref 40–51)
HEMOLYSIS INTERF INDEX SERPL-ACNC: 7
HGB BLD-MCNC: 14.8 G/DL (ref 13.5–17.5)
ICTERIC INTERF INDEX SERPL-ACNC: 1
IMM GRANULOCYTES # BLD AUTO: 0.02 10*3/UL
IMM GRANULOCYTES NFR BLD AUTO: 0 %
IMM. NE 2 SUSPECT FLAG (OHS): 10
LIPEMIC INTERF INDEX SERPL-ACNC: 1
LOW EVENT # SUSPECT FLAG (OHS): 80
LYMPHOCYTES # BLD AUTO: 2.6 10*3/UL (ref 0.6–4.6)
LYMPHOCYTES NFR BLD AUTO: 31 %
MANUAL DIFF? (OHS): NO
MCH RBC QN AUTO: 28.7 PG (ref 26–34)
MCHC RBC AUTO-ENTMCNC: 31.4 G/DL (ref 31–37)
MCV RBC AUTO: 91.7 FL (ref 80–100)
MO BLASTS SUSPECT FLAG (OHS): 30
MONOCYTES # BLD AUTO: 0.9 10*3/UL (ref 0.1–1.3)
MONOCYTES NFR BLD AUTO: 11 %
NEUTROPHILS # BLD AUTO: 4.54 10*3/UL (ref 2.1–9.2)
NEUTROPHILS NFR BLD AUTO: 56 %
NRBC BLD AUTO-RTO: 0 % (ref 0–0.2)
PLATELET # BLD AUTO: 440 10*3/UL (ref 130–400)
PLATELET CLUMPS SUSPECT FLAG (OHS): 60
PMV BLD AUTO: 9.7 FL (ref 7.4–10.4)
POTASSIUM SERPL-SCNC: 4.8 MMOL/L (ref 3.5–5.1)
PROT SERPL-MCNC: 6.9 G/DL (ref 6.4–8.3)
RBC # BLD AUTO: 5.15 10*6/UL (ref 4.5–5.9)
SODIUM SERPL-SCNC: 138 MMOL/L (ref 136–145)
WBC # SPEC AUTO: 8.2 10*3/UL (ref 4.5–11)

## 2022-04-10 ENCOUNTER — HISTORICAL (OUTPATIENT)
Dept: ADMINISTRATIVE | Facility: HOSPITAL | Age: 42
End: 2022-04-10
Payer: MEDICAID

## 2022-04-11 ENCOUNTER — HISTORICAL (OUTPATIENT)
Dept: ADMINISTRATIVE | Facility: HOSPITAL | Age: 42
End: 2022-04-11
Payer: MEDICAID

## 2022-04-28 VITALS
WEIGHT: 315 LBS | OXYGEN SATURATION: 95 % | BODY MASS INDEX: 50.62 KG/M2 | DIASTOLIC BLOOD PRESSURE: 81 MMHG | SYSTOLIC BLOOD PRESSURE: 137 MMHG | HEIGHT: 66 IN

## 2022-04-28 VITALS
OXYGEN SATURATION: 95 % | SYSTOLIC BLOOD PRESSURE: 137 MMHG | BODY MASS INDEX: 50.62 KG/M2 | WEIGHT: 315 LBS | DIASTOLIC BLOOD PRESSURE: 81 MMHG | HEIGHT: 66 IN

## 2022-07-18 ENCOUNTER — LAB VISIT (OUTPATIENT)
Dept: LAB | Facility: HOSPITAL | Age: 42
End: 2022-07-18
Attending: NURSE PRACTITIONER
Payer: MEDICAID

## 2022-07-18 DIAGNOSIS — D75.839 THROMBOCYTOSIS: ICD-10-CM

## 2022-07-18 DIAGNOSIS — E66.9 OBESITY, UNSPECIFIED CLASSIFICATION, UNSPECIFIED OBESITY TYPE, UNSPECIFIED WHETHER SERIOUS COMORBIDITY PRESENT: ICD-10-CM

## 2022-07-18 DIAGNOSIS — I10 HYPERTENSION, UNSPECIFIED TYPE: ICD-10-CM

## 2022-07-18 DIAGNOSIS — E11.9 DM (DIABETES MELLITUS): Primary | ICD-10-CM

## 2022-07-18 DIAGNOSIS — E78.5 HYPERLIPIDEMIA, UNSPECIFIED HYPERLIPIDEMIA TYPE: ICD-10-CM

## 2022-07-18 LAB
ALBUMIN SERPL-MCNC: 4 GM/DL (ref 3.5–5)
ALBUMIN/GLOB SERPL: 1.1 RATIO (ref 1.1–2)
ALP SERPL-CCNC: 72 UNIT/L (ref 40–150)
ALT SERPL-CCNC: 39 UNIT/L (ref 0–55)
AST SERPL-CCNC: 21 UNIT/L (ref 5–34)
BASOPHILS # BLD AUTO: 0.07 X10(3)/MCL (ref 0–0.2)
BASOPHILS NFR BLD AUTO: 0.7 %
BILIRUBIN DIRECT+TOT PNL SERPL-MCNC: 0.5 MG/DL
BUN SERPL-MCNC: 18.8 MG/DL (ref 8.9–20.6)
CALCIUM SERPL-MCNC: 9.6 MG/DL (ref 8.4–10.2)
CHLORIDE SERPL-SCNC: 99 MMOL/L (ref 98–107)
CHOLEST SERPL-MCNC: 179 MG/DL
CHOLEST/HDLC SERPL: 4 {RATIO} (ref 0–5)
CO2 SERPL-SCNC: 32 MMOL/L (ref 22–29)
CREAT SERPL-MCNC: 0.79 MG/DL (ref 0.73–1.18)
DEPRECATED CALCIDIOL+CALCIFEROL SERPL-MC: 29.2 NG/ML (ref 30–80)
EOSINOPHIL # BLD AUTO: 0.22 X10(3)/MCL (ref 0–0.9)
EOSINOPHIL NFR BLD AUTO: 2.1 %
ERYTHROCYTE [DISTWIDTH] IN BLOOD BY AUTOMATED COUNT: 14.2 % (ref 11.5–17)
EST. AVERAGE GLUCOSE BLD GHB EST-MCNC: 171.4 MG/DL
GLOBULIN SER-MCNC: 3.7 GM/DL (ref 2.4–3.5)
GLUCOSE SERPL-MCNC: 161 MG/DL (ref 74–100)
HBA1C MFR BLD: 7.6 %
HCT VFR BLD AUTO: 50.2 % (ref 42–52)
HDLC SERPL-MCNC: 41 MG/DL (ref 35–60)
HGB BLD-MCNC: 15.2 GM/DL (ref 14–18)
IMM GRANULOCYTES # BLD AUTO: 0.03 X10(3)/MCL (ref 0–0.04)
IMM GRANULOCYTES NFR BLD AUTO: 0.3 %
LDLC SERPL CALC-MCNC: 119 MG/DL (ref 50–140)
LYMPHOCYTES # BLD AUTO: 2.87 X10(3)/MCL (ref 0.6–4.6)
LYMPHOCYTES NFR BLD AUTO: 27.1 %
MCH RBC QN AUTO: 27.4 PG (ref 27–31)
MCHC RBC AUTO-ENTMCNC: 30.3 MG/DL (ref 33–36)
MCV RBC AUTO: 90.5 FL (ref 80–94)
MONOCYTES # BLD AUTO: 1.15 X10(3)/MCL (ref 0.1–1.3)
MONOCYTES NFR BLD AUTO: 10.9 %
NEUTROPHILS # BLD AUTO: 6.3 X10(3)/MCL (ref 2.1–9.2)
NEUTROPHILS NFR BLD AUTO: 58.9 %
NRBC BLD AUTO-RTO: 0 %
PLATELET # BLD AUTO: 404 X10(3)/MCL (ref 130–400)
PMV BLD AUTO: 9.5 FL (ref 7.4–10.4)
POTASSIUM SERPL-SCNC: 4.2 MMOL/L (ref 3.5–5.1)
PROT SERPL-MCNC: 7.7 GM/DL (ref 6.4–8.3)
RBC # BLD AUTO: 5.55 X10(6)/MCL (ref 4.7–6.1)
SODIUM SERPL-SCNC: 140 MMOL/L (ref 136–145)
TRIGL SERPL-MCNC: 95 MG/DL (ref 34–140)
TSH SERPL-ACNC: 2.26 UIU/ML (ref 0.35–4.94)
VLDLC SERPL CALC-MCNC: 19 MG/DL
WBC # SPEC AUTO: 10.6 X10(3)/MCL (ref 4.5–11.5)

## 2022-07-18 PROCEDURE — 85025 COMPLETE CBC W/AUTO DIFF WBC: CPT

## 2022-07-18 PROCEDURE — 83036 HEMOGLOBIN GLYCOSYLATED A1C: CPT

## 2022-07-18 PROCEDURE — 84443 ASSAY THYROID STIM HORMONE: CPT

## 2022-07-18 PROCEDURE — 82306 VITAMIN D 25 HYDROXY: CPT

## 2022-07-18 PROCEDURE — 80053 COMPREHEN METABOLIC PANEL: CPT

## 2022-07-18 PROCEDURE — 36415 COLL VENOUS BLD VENIPUNCTURE: CPT

## 2022-07-18 PROCEDURE — 80061 LIPID PANEL: CPT

## 2022-07-21 PROBLEM — M75.31 CALCIFIC TENDINITIS OF RIGHT SHOULDER: Status: ACTIVE | Noted: 2022-07-21

## 2022-07-21 PROBLEM — E66.01 MORBID OBESITY: Status: ACTIVE | Noted: 2022-07-21

## 2022-07-21 PROBLEM — M75.20 BICEPS TENDINITIS: Status: ACTIVE | Noted: 2022-07-21

## 2022-07-21 PROBLEM — I10 HYPERTENSION: Status: ACTIVE | Noted: 2022-07-21

## 2022-07-21 PROBLEM — E78.5 HYPERLIPIDEMIA: Status: ACTIVE | Noted: 2022-07-21

## 2022-07-21 PROBLEM — E11.9 DIABETES MELLITUS: Status: ACTIVE | Noted: 2022-07-21

## 2022-07-22 ENCOUNTER — OFFICE VISIT (OUTPATIENT)
Dept: INTERNAL MEDICINE | Facility: CLINIC | Age: 42
End: 2022-07-22
Payer: MEDICAID

## 2022-07-22 VITALS
HEART RATE: 82 BPM | HEIGHT: 66 IN | DIASTOLIC BLOOD PRESSURE: 86 MMHG | TEMPERATURE: 98 F | BODY MASS INDEX: 50.62 KG/M2 | SYSTOLIC BLOOD PRESSURE: 151 MMHG | RESPIRATION RATE: 20 BRPM | WEIGHT: 315 LBS

## 2022-07-22 DIAGNOSIS — Z13.5 SCREENING FOR DIABETIC RETINOPATHY: ICD-10-CM

## 2022-07-22 DIAGNOSIS — E11.9 TYPE 2 DIABETES MELLITUS WITHOUT COMPLICATION, WITHOUT LONG-TERM CURRENT USE OF INSULIN: Primary | ICD-10-CM

## 2022-07-22 DIAGNOSIS — M79.671 RIGHT FOOT PAIN: ICD-10-CM

## 2022-07-22 DIAGNOSIS — E66.01 MORBID OBESITY: ICD-10-CM

## 2022-07-22 DIAGNOSIS — I10 HYPERTENSION, UNSPECIFIED TYPE: ICD-10-CM

## 2022-07-22 DIAGNOSIS — D75.839 THROMBOCYTOSIS: ICD-10-CM

## 2022-07-22 DIAGNOSIS — E78.5 HYPERLIPIDEMIA, UNSPECIFIED HYPERLIPIDEMIA TYPE: ICD-10-CM

## 2022-07-22 LAB
APPEARANCE UR: CLEAR
BACTERIA #/AREA URNS AUTO: ABNORMAL /HPF
BILIRUB UR QL STRIP.AUTO: NEGATIVE MG/DL
COLOR UR AUTO: YELLOW
CREAT UR-MCNC: 187.9 MG/DL (ref 63–166)
GLUCOSE UR QL STRIP.AUTO: NORMAL MG/DL
HYALINE CASTS #/AREA URNS LPF: ABNORMAL /LPF
KETONES UR QL STRIP.AUTO: NEGATIVE MG/DL
LEUKOCYTE ESTERASE UR QL STRIP.AUTO: NEGATIVE UNIT/L
MICROALBUMIN UR-MCNC: 49 UG/ML
MICROALBUMIN/CREAT RATIO PNL UR: 26.1 MG/GM CR (ref 0–30)
MUCOUS THREADS URNS QL MICRO: ABNORMAL /LPF
NITRITE UR QL STRIP.AUTO: NEGATIVE
PH UR STRIP.AUTO: 6 [PH]
PROT UR QL STRIP.AUTO: ABNORMAL MG/DL
RBC #/AREA URNS AUTO: ABNORMAL /HPF
RBC UR QL AUTO: NEGATIVE UNIT/L
SP GR UR STRIP.AUTO: 1.03
UROBILINOGEN UR STRIP-ACNC: NORMAL MG/DL
WBC #/AREA URNS AUTO: ABNORMAL /HPF

## 2022-07-22 PROCEDURE — 1160F RVW MEDS BY RX/DR IN RCRD: CPT | Mod: CPTII,,, | Performed by: NURSE PRACTITIONER

## 2022-07-22 PROCEDURE — 3066F PR DOCUMENTATION OF TREATMENT FOR NEPHROPATHY: ICD-10-PCS | Mod: CPTII,,, | Performed by: NURSE PRACTITIONER

## 2022-07-22 PROCEDURE — 1159F MED LIST DOCD IN RCRD: CPT | Mod: CPTII,,, | Performed by: NURSE PRACTITIONER

## 2022-07-22 PROCEDURE — 1160F PR REVIEW ALL MEDS BY PRESCRIBER/CLIN PHARMACIST DOCUMENTED: ICD-10-PCS | Mod: CPTII,,, | Performed by: NURSE PRACTITIONER

## 2022-07-22 PROCEDURE — 3079F PR MOST RECENT DIASTOLIC BLOOD PRESSURE 80-89 MM HG: ICD-10-PCS | Mod: CPTII,,, | Performed by: NURSE PRACTITIONER

## 2022-07-22 PROCEDURE — 3060F POS MICROALBUMINURIA REV: CPT | Mod: CPTII,,, | Performed by: NURSE PRACTITIONER

## 2022-07-22 PROCEDURE — 99214 PR OFFICE/OUTPT VISIT, EST, LEVL IV, 30-39 MIN: ICD-10-PCS | Mod: S$PBB,,, | Performed by: NURSE PRACTITIONER

## 2022-07-22 PROCEDURE — 3077F SYST BP >= 140 MM HG: CPT | Mod: CPTII,,, | Performed by: NURSE PRACTITIONER

## 2022-07-22 PROCEDURE — 3008F BODY MASS INDEX DOCD: CPT | Mod: CPTII,,, | Performed by: NURSE PRACTITIONER

## 2022-07-22 PROCEDURE — 4010F ACE/ARB THERAPY RXD/TAKEN: CPT | Mod: CPTII,,, | Performed by: NURSE PRACTITIONER

## 2022-07-22 PROCEDURE — 99214 OFFICE O/P EST MOD 30 MIN: CPT | Mod: S$PBB,,, | Performed by: NURSE PRACTITIONER

## 2022-07-22 PROCEDURE — 81001 URINALYSIS AUTO W/SCOPE: CPT

## 2022-07-22 PROCEDURE — 3079F DIAST BP 80-89 MM HG: CPT | Mod: CPTII,,, | Performed by: NURSE PRACTITIONER

## 2022-07-22 PROCEDURE — 99215 OFFICE O/P EST HI 40 MIN: CPT | Mod: PBBFAC | Performed by: NURSE PRACTITIONER

## 2022-07-22 PROCEDURE — 1159F PR MEDICATION LIST DOCUMENTED IN MEDICAL RECORD: ICD-10-PCS | Mod: CPTII,,, | Performed by: NURSE PRACTITIONER

## 2022-07-22 PROCEDURE — 3066F NEPHROPATHY DOC TX: CPT | Mod: CPTII,,, | Performed by: NURSE PRACTITIONER

## 2022-07-22 PROCEDURE — 3077F PR MOST RECENT SYSTOLIC BLOOD PRESSURE >= 140 MM HG: ICD-10-PCS | Mod: CPTII,,, | Performed by: NURSE PRACTITIONER

## 2022-07-22 PROCEDURE — 3060F PR POS MICROALBUMINURIA RESULT DOCUMENTED/REVIEW: ICD-10-PCS | Mod: CPTII,,, | Performed by: NURSE PRACTITIONER

## 2022-07-22 PROCEDURE — 82043 UR ALBUMIN QUANTITATIVE: CPT

## 2022-07-22 PROCEDURE — 4010F PR ACE/ARB THEARPY RXD/TAKEN: ICD-10-PCS | Mod: CPTII,,, | Performed by: NURSE PRACTITIONER

## 2022-07-22 PROCEDURE — 3008F PR BODY MASS INDEX (BMI) DOCUMENTED: ICD-10-PCS | Mod: CPTII,,, | Performed by: NURSE PRACTITIONER

## 2022-07-22 RX ORDER — PROMETHAZINE HYDROCHLORIDE AND DEXTROMETHORPHAN HYDROBROMIDE 6.25; 15 MG/5ML; MG/5ML
5 SYRUP ORAL
COMMUNITY
Start: 2021-09-20 | End: 2022-07-22

## 2022-07-22 RX ORDER — ATORVASTATIN CALCIUM 10 MG/1
10 TABLET, FILM COATED ORAL NIGHTLY
Qty: 90 TABLET | Refills: 1 | Status: SHIPPED | OUTPATIENT
Start: 2022-07-22 | End: 2023-02-06 | Stop reason: SDUPTHER

## 2022-07-22 RX ORDER — LISINOPRIL 20 MG/1
20 TABLET ORAL DAILY
COMMUNITY
Start: 2022-03-22 | End: 2022-07-22 | Stop reason: SDUPTHER

## 2022-07-22 RX ORDER — LORATADINE 10 MG/1
10 TABLET ORAL DAILY
COMMUNITY

## 2022-07-22 RX ORDER — DICLOFENAC SODIUM 10 MG/G
GEL TOPICAL 4 TIMES DAILY PRN
Qty: 100 G | Refills: 1 | Status: SHIPPED | OUTPATIENT
Start: 2022-07-22 | End: 2023-02-06

## 2022-07-22 RX ORDER — ATORVASTATIN CALCIUM 10 MG/1
10 TABLET, FILM COATED ORAL
COMMUNITY
Start: 2021-06-18 | End: 2022-07-22 | Stop reason: SDUPTHER

## 2022-07-22 RX ORDER — METHOCARBAMOL 750 MG/1
1500 TABLET, FILM COATED ORAL EVERY 8 HOURS PRN
COMMUNITY
Start: 2021-12-21 | End: 2023-11-27

## 2022-07-22 RX ORDER — METFORMIN HYDROCHLORIDE 500 MG/1
1000 TABLET, EXTENDED RELEASE ORAL 2 TIMES DAILY
COMMUNITY
Start: 2022-03-22 | End: 2022-07-22 | Stop reason: SDUPTHER

## 2022-07-22 RX ORDER — CETIRIZINE HYDROCHLORIDE 10 MG/1
10 TABLET ORAL
COMMUNITY
Start: 2021-09-20 | End: 2022-07-22

## 2022-07-22 RX ORDER — DICLOFENAC SODIUM 10 MG/G
GEL TOPICAL
COMMUNITY
Start: 2022-03-22 | End: 2022-07-22 | Stop reason: SDUPTHER

## 2022-07-22 RX ORDER — LISINOPRIL 20 MG/1
20 TABLET ORAL DAILY
Qty: 90 TABLET | Refills: 1 | Status: SHIPPED | OUTPATIENT
Start: 2022-07-22 | End: 2023-02-06 | Stop reason: SDUPTHER

## 2022-07-22 RX ORDER — ASPIRIN 81 MG/1
81 TABLET ORAL DAILY
COMMUNITY
Start: 2021-12-21

## 2022-07-22 RX ORDER — MELOXICAM 15 MG/1
15 TABLET ORAL DAILY PRN
COMMUNITY
Start: 2021-06-18 | End: 2023-11-27 | Stop reason: HOSPADM

## 2022-07-22 RX ORDER — METFORMIN HYDROCHLORIDE 500 MG/1
1000 TABLET, EXTENDED RELEASE ORAL 2 TIMES DAILY
Qty: 360 TABLET | Refills: 1 | Status: SHIPPED | OUTPATIENT
Start: 2022-07-22 | End: 2023-02-06

## 2022-07-22 NOTE — ASSESSMENT & PLAN NOTE
Elevated today, not taking medication  Resume Lisinopril  Educated on aerobic exercise (3-5 days/week) and a low-fat, low-sodium diet  Avoid excess ETOH consumption. Smoking cessation if applicable  ED precautions (s/s of CVA, etc)

## 2022-07-22 NOTE — ASSESSMENT & PLAN NOTE
Rest: Avoid excessive and repetitive impact on foot/heel  Icing: May apply ice to affected region for 20 minutes at a time, up to 4 times per day  Stretching: exercises as discussed  NSAIDs PRN (if kidney function normal)  Arch-supportive/Athletic shoes  Defers Ortho referral for now d/t finances

## 2022-07-22 NOTE — ASSESSMENT & PLAN NOTE
Resume Atorvastatin 10 mg po daily  Educated on a low-fat, low-cholesterol diet and aerobic exercise (20-30 mins/day x 5 days a week). Encouraged healthy fruits and veggie intake. Increase water intake. Avoid excess ETOH intake and smoking

## 2022-07-22 NOTE — ASSESSMENT & PLAN NOTE
Resume Metformin ER to 1,000 mg po BID--Pt wishes to avoid additional medications for now. Wants to continue with medication compliance and diet control  RTC 3 mths with POC A1c

## 2022-07-22 NOTE — PROGRESS NOTES
"  HERACLIO Tolliver   [facility]  [address] [address]      PATIENT NAME: Dimas Sales  : 1980  DATE: 22  MRN: [id number]      Billing Provider: HERACLIO Tolliver  Level of Service:   Patient PCP Information     Provider PCP Type    HERACLIO Tolliver General          Reason for Visit / Chief Complaint: Follow-up (Lab review)       History of Present Illness / Problem Focused Workflow     Dimas Sales presents to the clinic with Follow-up (Lab review)     Initial Visit (2018): 38 y.o.  male presenting to the clinic to re-establish primary care. Previous PCP ANALIA Dallas NP. Last OV 2018. PMHx significant for HTN, T2DM, and HLD. Bp at goal. Currently taking Lisinopril 2.5 mg po daily. HgA1c 6.4% (2018). Currently taking Metformin  mg po daily. Denies s/s of hyper/hypoglycemia or neuropathy. Pt admits that recent eating habits have been poor. LDL 66. Currently taking Atorvastatin 10 mg po daily. Tolerating well. Working on losing weight. Took Adipex in the past with good results, however, he gained all weight back plus more after stopping drug. Wants to try to lose weight naturally. Denies fever, chills, HA, CP, SOB, or any other concerns today.    (2019): 38 y.o.  male presenting to the clinic for routine f/u. PMHx significant for HTN, T2DM, and HLD. Bp at goal. Currently taking Lisinopril 2.5 mg po daily. HgA1c 6.5%. Currently taking Metformin  mg po daily. Denies s/s of hyper/hypoglycemia. Admits occasional "shooting/burning" pain in BLE and feet. LDL 77. Currently taking Atorvastatin 10 mg po daily. Tolerating well. Pt still actively trying to lose weight. C/o right knee pain. Reports that he was at work last week and went to move a piece of iron by pushing it with the inner aspect of right foot, now, pt states that he believes he strained his right knee. " "C/o moderate, aching right knee pain. Denies erythema or joint swelling. Pain exacerbated with bending and extending right knee. Relieved with rest. Occasionally associated with "burning" sensation down right leg. Has not tried any remedies for pain. Denies fever, chills, HA, CP, SOB, or any other concerns today.    (6/24/19): 38 y.o.  male presenting to the clinic for routine f/u HTN, T2DM, and HLD. Now following [facility] for right knee pain. HgA1c 6.4%. Currently taking Metformin  mg po daily. Denies s/s of hyper/hypoglycemia. LDL 95. Currently taking Atorvastatin 10 mg po daily. Requesting medication refill. Today, pt c/o right shoulder pain. Reports hx of fall that sparked right shoulder pain. Pain would only occur on occasion are was largely controlled following the fall. However, pt reports an increase in pain over the last several weeks. He works in construction and admits heavy lifting, operating heavy machinery, and climbing. Pain reported as a throbbing pain that is moderate to severe in intensity. Often travels down right arm. Associated with numbness and tingling to distal right arm (also occurs in distal left arm). Exacerbated by strenuous activity. Relieved with NSAIDs and Gabapentin as needed. States, "I might need an injection in my shoulder." Denies CP or SOB. No other problems stated.    (6/12/2020): 39 y.o.  male with a PmHx of HTN, T2DM, HLD, and morbid obesity presenting for routine f/u. He's also following [facility] for right knee pain. Bp at goal. HgA1c 6.5%. Currently taking Metformin  mg po daily. Tolerating well. Denies s/s of hyper/hypoglycemia. . Currently prescribed Atorvastatin 10 mg po daily. He admits that he's not been 100% compliant with his medications. Pt c/o left foot/heel pain. He works in construction. He wears steel toe boots on a daily basis and stands on feet on concrete for the majority of the day. He climbs ladders often as well. He states that " sometimes it hurts to even put his feet on the ground to begin walking in the morning. He describes the pain as aching and burning. He's been prescribed Gabapentin in the past, which he reports helps with the pain, but he cannot take it all of the time because it causes him to be too drowsiness when he wakes for work in the am. He reports that he's tried interventions with a water bottle and tennis ball to relieve the pain to bottom of feet with minimal relief. He's also c/o a lesion to bridge of nose x many years, recently grown in size. States he had an aunt who had a similar lesion that tested + for skin cancer in the past. Works in the sun on most days. Rarely wears sun screen. Lastly, he's c/o darkening discoloration to distal lower ext. H/o cellulitis in the past in same region. Rarely swells. No calf pain or redness at this time. No other problems stated.    (12/18/2020): 40 y.o.  male with a PmHx of HTN, T2DM, HLD, plantar fasciitis, h/o COVID-19, and morbid obesity presenting for routine f/u. He's also following [facility] for right knee pain. Seen in ENT 12/16/2020. Previously referred from [facility] for BCC of nose (confirmed by bx). He had lesion excised 12/2 in ENT. States had stitches but one of them popped one day while lying down and rubbing on pillow. He presented back to ENT (not medicine clinic) requesting to have stitches removed/wound evaluated but he was told to go to ED for such. At present, lesion is scabbed.  He is to keep clean and apply Bacitracin ointment as prescribed. He apparently declined a referral for reconstructive sx. Will f/u in ENT in 2 weeks. Bp at goal. HgA1c 7.3%. Currently prescribed Metformin  mg po daily.  (goal <100). Admits not taking regularly, then, when he started taking ~1 week ago, he was taking 20 mg total of Lipitor as he had two bottles of the same medication. Pt admits that since having COVID-19 in July, he's not been taking his medications  regularly. Admits that the virus took a toll on his physical and mental health. He felt depressed being isolated from family, including , at the time. He subsequently lost his job (this had a major impact on depressed mood). Thus, he was not following his routine medical regimen or diet as a way to cope. At present, he denies SI/HI. He is finding some work off and on, and is making an effort to take medications as prescribed. He is amenable to receiving the influenza vaccine. CBC and LFTs were abnl during hospitalization with COVID-19 in July. Has not been re-assessed since. No other problems stated.    (2021): 40 y.o.  male with a PmHx of HTN, T2DM, HLD, plantar fasciitis, h/o COVID-19, and morbid obesity presenting for routine f/u. Bp at goal. HgA1c 8.1%. Currently prescribed Metformin  mg po daily. Admits stopped taking medication for a while. LDL 78 (goal <100). Prescribed Atorvastatin 10 mg po daily. Pt admits that since having COVID-19 in July, he's not been taking his medications regularly. Admits that the virus took a toll on his physical and mental health. He feels depressed due to losing his job (this had a major impact on depressed mood). Feels he can't provide for his family. Saved money is running out. Also c/o mx joint pains; specifically bilateral shoulders x years. States shoulders ache. Has trouble lifting things above his head. 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 Got denied for disability. Has re-applied; waiting for an answer. Gaining weight. Doesn't feel motivated to do much or exercise. At present, he denies SI/HI. Lastly, c/o cough productive of yellow mucous, nasal congestion, runny nose, itchy ears, crusty eyes x 2 weeks. States older daughter had symptoms first. Then a younger dtr and wife. His symptoms are lingering. No fever or chills at present but felt feverish at start of illness. No other problems  "stated.    (9/20/2021): 40 y.o.  male with a PmHx of HTN, T2DM, HLD, plantar fasciitis, h/o COVID-19, and morbid obesity presenting for routine f/u. Bp at goal. HgA1c 9.2%, increased from previous. Currently prescribed Metformin  mg po BID. Admits stopped taking medication for a while at last visit. Now, he admits skipping doses. He is not following an ADA diet. Has been eating "pizza and cakes." He is following Ortho for bilateral shoulder pain. Currently undergoing P.T. Bp elevated today. Taking Lisinopril 2.5 mg po daily. Originally started for renal protection given h/o DM. Denies HA, dizziness, weakness, numbness/tingling, CP, SOB, or any acute concerns. No other concerns.    (12/21/2021): 41 y.o.  male with a PmHx of HTN, T2DM, HLD, plantar fasciitis, h/o COVID-19, and morbid obesity presenting for routine f/u. Bp at goal. HgA1c 7.7%, improved from previous. FBG also significantly improved. Pt reports that he's making a conscious effort to make better food choices such as choosing sugar-free drinks over regular sodas. Also "cutting back" on intake overall. Currently prescribed Metformin ER 1,000 mg po BID. Tolerating medication better. Bp at goal. Declines vaccines. Following Ortho clinic for right shoulder pain. No other concerns.      Telemedicine Visit (3/22/2022): 41 y.o.  male with a PmHx of HTN, T2DM, HLD, plantar fasciitis, h/o COVID-19, and morbid obesity presenting for routine DM f/u. HgA1c 7.1%, improved from previous (9.2%, 7.7%). FBG also consistently improving from September assessment; now 140s. 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 He's been eating healthier on his new job. States eating salads. Reports losing ~20+ lbs from last visit. Total platelet count (initially elevated during COVID infection 7/2020), improving. Patient was previously instructed to start ASA 81 mg po daily, but admits forgetting to obtain. He is c/o intermittent, shocking right " "ankle pain and swelling. He has not tried any medications to relieve pain. Pain is worse with prolonged ambulation. Relieved with rest. States played sports in high school and can recall spraining ankle many times. No other concerns.    TV's (7/22/2022): 41 y.o.  male with a PmHx of HTN, T2DM, HLD, plantar fasciitis, h/o COVID-19, elevated platelets (h/o splenectomy), and morbid obesity presenting for routine DM f/u. HgA1c 7.6%, worsened from previous. Prescribed Metformin 1,000 mg po BID but admits not taking as prescribed. Also reports "off of diet." One of his children had a birthday earlier this month. Admits had been eating a lot of sweets around that time. BP slightly elevated today. Asymptomatic. Prescribed Lisinopril 20 mg po daily but not taking as prescribed. Platelets improved to 404k, glucose 161,  (not taking statin), and microalb slightly elevated. He is due for eye and foot exam today. C/o intermittent throbbing, burning right foot/heel pain. 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 This exacerbates his pain. Known history of plantar fasciitis. States had CSI in the past with some relief. Uses topical Voltaren with mild relief.    Diabetes  He presents for his follow-up diabetic visit. He has type 2 diabetes mellitus. His disease course has been stable. There are no hypoglycemic associated symptoms. There are no diabetic associated symptoms. There are no hypoglycemic complications. Symptoms are stable. There are no diabetic complications. Risk factors for coronary artery disease include diabetes mellitus, dyslipidemia, hypertension, male sex, obesity, sedentary lifestyle and stress. Current diabetic treatment includes oral agent (monotherapy). He is compliant with treatment some of the time. His weight is stable. He is following a generally unhealthy diet. When asked about meal planning, he reported none. He has not had a previous visit with a " dietitian. He rarely participates in exercise. An ACE inhibitor/angiotensin II receptor blocker is being taken. He does not see a podiatrist.Eye exam is not current.       Review of Systems     Review of Systems   Musculoskeletal: Positive for arthralgias.   All other systems reviewed and are negative.      Medical / Social / Family History   History reviewed. No pertinent past medical history.    Past Surgical History:   Procedure Laterality Date    SPLENECTOMY, TOTAL         Social History    reports that he has never smoked. He has never used smokeless tobacco. He reports current alcohol use of about 2.0 standard drinks of alcohol per week. He reports that he does not use drugs.    Family History  's family history includes Diabetes in his father and mother; Hypertension in his father.    Medications and Allergies     Medications  Medication List with Changes/Refills   Current Medications    ASPIRIN (ECOTRIN) 81 MG EC TABLET    Take 81 mg by mouth.    LORATADINE (CLARITIN) 10 MG TABLET    Take 10 mg by mouth.    MELOXICAM (MOBIC) 15 MG TABLET    Take 15 mg by mouth.    METHOCARBAMOL (ROBAXIN) 750 MG TAB    Take 1,500 mg by mouth.   Changed and/or Refilled Medications    Modified Medication Previous Medication    ATORVASTATIN (LIPITOR) 10 MG TABLET atorvastatin (LIPITOR) 10 MG tablet       Take 1 tablet (10 mg total) by mouth every evening.    Take 10 mg by mouth.    DICLOFENAC SODIUM (VOLTAREN) 1 % GEL diclofenac sodium (VOLTAREN) 1 % Gel       Apply topically 4 (four) times daily as needed (pain).    APPLY 4GM TO AFFECTED AREA FOUR TIMES A DAY AS NEEDED FOR PAIN NOT TO EXCEED 16GRAMS/DAY/SINGLE JOINT OF LOWER EXTREMITIES    LISINOPRIL (PRINIVIL,ZESTRIL) 20 MG TABLET lisinopriL (PRINIVIL,ZESTRIL) 20 MG tablet       Take 1 tablet (20 mg total) by mouth once daily.    Take 20 mg by mouth once daily.    METFORMIN (GLUCOPHAGE-XR) 500 MG ER 24HR TABLET metFORMIN (GLUCOPHAGE-XR) 500 MG ER 24hr tablet       Take  2 tablets (1,000 mg total) by mouth 2 (two) times daily.    Take 1,000 mg by mouth 2 (two) times daily.   Discontinued Medications    CETIRIZINE (ZYRTEC) 10 MG TABLET    Take 10 mg by mouth.    PROMETHAZINE-DEXTROMETHORPHAN (PROMETHAZINE-DM) 6.25-15 MG/5 ML SYRP    Take 5 mLs by mouth.       Allergies  Review of patient's allergies indicates:  No Known Allergies    Physical Examination     Vitals:    07/22/22 0930   BP: (!) 151/86   Pulse:    Resp:    Temp:      Physical Exam  Constitutional:       Appearance: Normal appearance. He is obese.   HENT:      Head: Normocephalic and atraumatic.      Right Ear: Tympanic membrane, ear canal and external ear normal.      Left Ear: Tympanic membrane, ear canal and external ear normal.      Nose: Nose normal.      Mouth/Throat:      Mouth: Mucous membranes are moist.      Pharynx: Oropharynx is clear.   Eyes:      Extraocular Movements: Extraocular movements intact.      Conjunctiva/sclera: Conjunctivae normal.      Pupils: Pupils are equal, round, and reactive to light.   Cardiovascular:      Rate and Rhythm: Normal rate and regular rhythm.      Pulses: Normal pulses.      Heart sounds: Normal heart sounds.   Pulmonary:      Effort: Pulmonary effort is normal.      Breath sounds: Normal breath sounds.   Abdominal:      General: Bowel sounds are normal.      Palpations: Abdomen is soft.   Musculoskeletal:         General: Normal range of motion.   Feet:      Right foot:      Protective Sensation: 8 sites tested. 8 sites sensed.      Skin integrity: Skin integrity normal.      Toenail Condition: Right toenails are abnormally thick.      Left foot:      Protective Sensation: 8 sites tested. 8 sites sensed.      Skin integrity: Skin integrity normal.      Toenail Condition: Left toenails are abnormally thick.   Skin:     General: Skin is warm and dry.   Neurological:      General: No focal deficit present.      Mental Status: He is alert and oriented to person, place, and time.    Psychiatric:         Mood and Affect: Mood normal.         Behavior: Behavior normal.         Thought Content: Thought content normal.         Judgment: Judgment normal.           Results     Lab Results   Component Value Date    WBC 10.6 07/18/2022    RBC 5.55 07/18/2022    HGB 15.2 07/18/2022    HCT 50.2 07/18/2022    MCV 90.5 07/18/2022    MCH 27.4 07/18/2022    MCHC 30.3 (L) 07/18/2022    RDW 14.2 07/18/2022     (H) 07/18/2022    MPV 9.5 07/18/2022     CMP  Sodium Level   Date Value Ref Range Status   07/18/2022 140 136 - 145 mmol/L Final     Potassium Level   Date Value Ref Range Status   07/18/2022 4.2 3.5 - 5.1 mmol/L Final     Carbon Dioxide   Date Value Ref Range Status   07/18/2022 32 (H) 22 - 29 mmol/L Final     Blood Urea Nitrogen   Date Value Ref Range Status   07/18/2022 18.8 8.9 - 20.6 mg/dL Final     Creatinine   Date Value Ref Range Status   07/18/2022 0.79 0.73 - 1.18 mg/dL Final     Calcium Level Total   Date Value Ref Range Status   07/18/2022 9.6 8.4 - 10.2 mg/dL Final     Albumin Level   Date Value Ref Range Status   07/18/2022 4.0 3.5 - 5.0 gm/dL Final     Bilirubin Total   Date Value Ref Range Status   07/18/2022 0.5 <=1.5 mg/dL Final     Alkaline Phosphatase   Date Value Ref Range Status   07/18/2022 72 40 - 150 unit/L Final     Aspartate Aminotransferase   Date Value Ref Range Status   07/18/2022 21 5 - 34 unit/L Final     Alanine Aminotransferase   Date Value Ref Range Status   07/18/2022 39 0 - 55 unit/L Final     Estimated GFR-Non    Date Value Ref Range Status   07/18/2022 >60 mls/min/1.73/m2 Final     Lab Results   Component Value Date    CHOL 179 07/18/2022     Lab Results   Component Value Date    HDL 41 07/18/2022     No results found for: LDLCALC  Lab Results   Component Value Date    TRIG 95 07/18/2022     No results found for: CHOLHDL  Lab Results   Component Value Date    TSH 2.2610 07/18/2022     Lab Results   Component Value Date    PHUR 7.0 06/11/2021     PROTEINUA Trace (A) 07/22/2022    GLUCUA Negative 06/11/2021    KETONESU Negative 06/11/2021    OCCULTUA Negative 06/11/2021    NITRITE Negative 06/11/2021    LEUKOCYTESUR Negative 07/22/2022           Assessment and Plan (including Health Maintenance)     Plan:         Health Maintenance Due   Topic Date Due    Eye Exam  06/27/2022       Problem List Items Addressed This Visit        Cardiac/Vascular    Hyperlipidemia    Overview     Atorvastatin 10 mg po daily           Current Assessment & Plan     Resume Atorvastatin 10 mg po daily  Educated on a low-fat, low-cholesterol diet and aerobic exercise (20-30 mins/day x 5 days a week). Encouraged healthy fruits and veggie intake. Increase water intake. Avoid excess ETOH intake and smoking             Relevant Medications    atorvastatin (LIPITOR) 10 MG tablet    Hypertension    Overview     Lisinopril 20 mg po daily           Current Assessment & Plan     Elevated today, not taking medication  Resume Lisinopril  Educated on aerobic exercise (3-5 days/week) and a low-fat, low-sodium diet  Avoid excess ETOH consumption. Smoking cessation if applicable  ED precautions (s/s of CVA, etc)               Relevant Medications    lisinopriL (PRINIVIL,ZESTRIL) 20 MG tablet       Oncology    Thrombocytosis    Current Assessment & Plan     Much improved and essentially WNL at current. Will monitor              Endocrine    Morbid obesity    Current Assessment & Plan     Educated on aerobic exercise for 30 mins/day, at least 5 days per week. Low-fat diet             Type 2 diabetes mellitus without complication - Primary    Overview     A1c level: 7.6%, goal < 7%  CBG trends: none provided  Educated on diabetic diet: Low-fat, Low-carb, Low-cholesterol. Avoid sugary/dark drinks/sodas and white foods (i.e., bread, pasta, potatoes, rice)  Aerobic exercise: 20-30 min/day x 5 days/week  Diabetic Eye Exam: 7/22/22  Diabetic Foot Exam: 7/22/22  Microalbumin-U: elevated (will  monitor)  Kidney Protection: Lisinopril  Statin Therapy: Atorvastatin  Educated on Hypoglycemic s/s and interventions. Call office with any questions or concerns  Strict glucose monitoring. Bring blood glucose logs/meter to each OV             Current Assessment & Plan     Resume Metformin ER to 1,000 mg po BID--Pt wishes to avoid additional medications for now. Wants to continue with medication compliance and diet control  RTC 3 mths with POC A1c           Relevant Medications    metFORMIN (GLUCOPHAGE-XR) 500 MG ER 24hr tablet       Orthopedic    Right foot pain    Current Assessment & Plan     Rest: Avoid excessive and repetitive impact on foot/heel  Icing: May apply ice to affected region for 20 minutes at a time, up to 4 times per day  Stretching: exercises as discussed  NSAIDs PRN (if kidney function normal)  Arch-supportive/Athletic shoes  Defers Ortho referral for now d/t finances             Relevant Medications    diclofenac sodium (VOLTAREN) 1 % Gel      Other Visit Diagnoses     Screening for diabetic retinopathy        Relevant Orders    Diabetic Eye Screening Photo          Health Maintenance Topics with due status: Not Due       Topic Last Completion Date    Pneumococcal Vaccines (Age 0-64) 06/23/2015    TETANUS VACCINE 09/11/2017    Influenza Vaccine 12/01/2021    Lipid Panel 07/18/2022    Hemoglobin A1c 07/18/2022    Diabetes Urine Screening 07/22/2022    Foot Exam 07/22/2022       Future Appointments   Date Time Provider Department Center   10/21/2022  8:30 AM HERACLIO Tolliver [facility]            Signature:  HERACLIO Tolliver  [facility]  [address] [facility] [id number]    Date of encounter: 7/22/22

## 2022-12-02 ENCOUNTER — TELEPHONE (OUTPATIENT)
Dept: INTERNAL MEDICINE | Facility: CLINIC | Age: 42
End: 2022-12-02
Payer: MEDICAID

## 2022-12-02 NOTE — TELEPHONE ENCOUNTER
Patient called wants to know if he should get lab work done before his appt. No labs in system. Please advise.

## 2022-12-06 NOTE — TELEPHONE ENCOUNTER
Labs are not done as he will do a point of care in clinic. He will need to be seen F2F. If he wishes to reschedule, please do so.

## 2023-02-06 ENCOUNTER — OFFICE VISIT (OUTPATIENT)
Dept: INTERNAL MEDICINE | Facility: CLINIC | Age: 43
End: 2023-02-06
Payer: MEDICAID

## 2023-02-06 VITALS
TEMPERATURE: 98 F | HEART RATE: 89 BPM | BODY MASS INDEX: 50.62 KG/M2 | RESPIRATION RATE: 20 BRPM | SYSTOLIC BLOOD PRESSURE: 144 MMHG | HEIGHT: 66 IN | DIASTOLIC BLOOD PRESSURE: 80 MMHG | WEIGHT: 315 LBS

## 2023-02-06 DIAGNOSIS — Z00.00 WELLNESS EXAMINATION: ICD-10-CM

## 2023-02-06 DIAGNOSIS — E78.5 HYPERLIPIDEMIA, UNSPECIFIED HYPERLIPIDEMIA TYPE: ICD-10-CM

## 2023-02-06 DIAGNOSIS — E11.9 TYPE 2 DIABETES MELLITUS WITHOUT COMPLICATION, WITHOUT LONG-TERM CURRENT USE OF INSULIN: Primary | ICD-10-CM

## 2023-02-06 DIAGNOSIS — E11.65 TYPE 2 DIABETES MELLITUS WITH HYPERGLYCEMIA, WITHOUT LONG-TERM CURRENT USE OF INSULIN: ICD-10-CM

## 2023-02-06 DIAGNOSIS — Z80.0 FAMILY HISTORY OF COLON CANCER: ICD-10-CM

## 2023-02-06 DIAGNOSIS — I10 HYPERTENSION, UNSPECIFIED TYPE: ICD-10-CM

## 2023-02-06 LAB — HBA1C MFR BLD: 12.1 %

## 2023-02-06 PROCEDURE — 3046F HEMOGLOBIN A1C LEVEL >9.0%: CPT | Mod: CPTII,,, | Performed by: NURSE PRACTITIONER

## 2023-02-06 PROCEDURE — 99215 OFFICE O/P EST HI 40 MIN: CPT | Mod: S$PBB,,, | Performed by: NURSE PRACTITIONER

## 2023-02-06 PROCEDURE — 3008F BODY MASS INDEX DOCD: CPT | Mod: CPTII,,, | Performed by: NURSE PRACTITIONER

## 2023-02-06 PROCEDURE — 1159F MED LIST DOCD IN RCRD: CPT | Mod: CPTII,,, | Performed by: NURSE PRACTITIONER

## 2023-02-06 PROCEDURE — 1160F PR REVIEW ALL MEDS BY PRESCRIBER/CLIN PHARMACIST DOCUMENTED: ICD-10-PCS | Mod: CPTII,,, | Performed by: NURSE PRACTITIONER

## 2023-02-06 PROCEDURE — 3079F PR MOST RECENT DIASTOLIC BLOOD PRESSURE 80-89 MM HG: ICD-10-PCS | Mod: CPTII,,, | Performed by: NURSE PRACTITIONER

## 2023-02-06 PROCEDURE — 3008F PR BODY MASS INDEX (BMI) DOCUMENTED: ICD-10-PCS | Mod: CPTII,,, | Performed by: NURSE PRACTITIONER

## 2023-02-06 PROCEDURE — 83036 HEMOGLOBIN GLYCOSYLATED A1C: CPT | Mod: PBBFAC | Performed by: NURSE PRACTITIONER

## 2023-02-06 PROCEDURE — 99215 PR OFFICE/OUTPT VISIT, EST, LEVL V, 40-54 MIN: ICD-10-PCS | Mod: S$PBB,,, | Performed by: NURSE PRACTITIONER

## 2023-02-06 PROCEDURE — 1160F RVW MEDS BY RX/DR IN RCRD: CPT | Mod: CPTII,,, | Performed by: NURSE PRACTITIONER

## 2023-02-06 PROCEDURE — 3077F SYST BP >= 140 MM HG: CPT | Mod: CPTII,,, | Performed by: NURSE PRACTITIONER

## 2023-02-06 PROCEDURE — 1159F PR MEDICATION LIST DOCUMENTED IN MEDICAL RECORD: ICD-10-PCS | Mod: CPTII,,, | Performed by: NURSE PRACTITIONER

## 2023-02-06 PROCEDURE — 3077F PR MOST RECENT SYSTOLIC BLOOD PRESSURE >= 140 MM HG: ICD-10-PCS | Mod: CPTII,,, | Performed by: NURSE PRACTITIONER

## 2023-02-06 PROCEDURE — 3079F DIAST BP 80-89 MM HG: CPT | Mod: CPTII,,, | Performed by: NURSE PRACTITIONER

## 2023-02-06 PROCEDURE — 3046F PR MOST RECENT HEMOGLOBIN A1C LEVEL > 9.0%: ICD-10-PCS | Mod: CPTII,,, | Performed by: NURSE PRACTITIONER

## 2023-02-06 PROCEDURE — 99214 OFFICE O/P EST MOD 30 MIN: CPT | Mod: PBBFAC | Performed by: NURSE PRACTITIONER

## 2023-02-06 RX ORDER — LISINOPRIL 20 MG/1
20 TABLET ORAL DAILY
Qty: 90 TABLET | Refills: 1 | Status: SHIPPED | OUTPATIENT
Start: 2023-02-06 | End: 2023-08-23 | Stop reason: SDUPTHER

## 2023-02-06 RX ORDER — INSULIN PUMP SYRINGE, 3 ML
EACH MISCELLANEOUS
Qty: 1 EACH | Refills: 0 | Status: SHIPPED | OUTPATIENT
Start: 2023-02-06 | End: 2023-05-03

## 2023-02-06 RX ORDER — ATORVASTATIN CALCIUM 10 MG/1
10 TABLET, FILM COATED ORAL NIGHTLY
Qty: 90 TABLET | Refills: 1 | Status: SHIPPED | OUTPATIENT
Start: 2023-02-06 | End: 2023-08-23 | Stop reason: SDUPTHER

## 2023-02-06 NOTE — PROGRESS NOTES
"  HERACLIO Tolliver   [facility]  [address] [facility] [address]      PATIENT NAME: Dimas Sales  : 1980  DATE: 23  MRN: [id number]      Billing Provider: HERACLIO Tolliver  Level of Service:   Patient PCP Information       Provider PCP Type    HERACLIO Tolliver General            Reason for Visit / Chief Complaint: Follow-up and Diabetes       History of Present Illness / Problem Focused Workflow     Dimas Sales presents to the clinic with Follow-up and Diabetes     Initial Visit (2018): 38 y.o.  male presenting to the clinic to re-establish primary care. Previous PCP ANALIA [place] NP. Last OV 2018. PMHx significant for HTN, T2DM, and HLD. Bp at goal. Currently taking Lisinopril 2.5 mg po daily. HgA1c 6.4% (2018). Currently taking Metformin  mg po daily. Denies s/s of hyper/hypoglycemia or neuropathy. Pt admits that recent eating habits have been poor. LDL 66. Currently taking Atorvastatin 10 mg po daily. Tolerating well. Working on losing weight. Took Adipex in the past with good results, however, he gained all weight back plus more after stopping drug. Wants to try to lose weight naturally. Denies fever, chills, HA, CP, SOB, or any other concerns today.    (2019): 38 y.o.  male presenting to the clinic for routine f/u. PMHx significant for HTN, T2DM, and HLD. Bp at goal. Currently taking Lisinopril 2.5 mg po daily. HgA1c 6.5%. Currently taking Metformin  mg po daily. Denies s/s of hyper/hypoglycemia. Admits occasional "shooting/burning" pain in BLE and feet. LDL 77. Currently taking Atorvastatin 10 mg po daily. Tolerating well. Pt still actively trying to lose weight. C/o right knee pain. Reports that he was at work last week and went to move a piece of iron by pushing it with the inner aspect of right foot, now, pt states that he believes he strained his right knee. " "C/o moderate, aching right knee pain. Denies erythema or joint swelling. Pain exacerbated with bending and extending right knee. Relieved with rest. Occasionally associated with "burning" sensation down right leg. Has not tried any remedies for pain. Denies fever, chills, HA, CP, SOB, or any other concerns today.    (6/24/19): 38 y.o.  male presenting to the clinic for routine f/u HTN, T2DM, and HLD. Now following [facility] for right knee pain. HgA1c 6.4%. Currently taking Metformin  mg po daily. Denies s/s of hyper/hypoglycemia. LDL 95. Currently taking Atorvastatin 10 mg po daily. Requesting medication refill. Today, pt c/o right shoulder pain. Reports hx of fall that sparked right shoulder pain. Pain would only occur on occasion are was largely controlled following the fall. However, pt reports an increase in pain over the last several weeks. He works in construction and admits heavy lifting, operating heavy machinery, and climbing. Pain reported as a throbbing pain that is moderate to severe in intensity. Often travels down right arm. Associated with numbness and tingling to distal right arm (also occurs in distal left arm). Exacerbated by strenuous activity. Relieved with NSAIDs and Gabapentin as needed. States, "I might need an injection in my shoulder." Denies CP or SOB. No other problems stated.    (6/12/2020): 39 y.o.  male with a PmHx of HTN, T2DM, HLD, and morbid obesity presenting for routine f/u. He's also following [facility] for right knee pain. Bp at goal. HgA1c 6.5%. Currently taking Metformin  mg po daily. Tolerating well. Denies s/s of hyper/hypoglycemia. . Currently prescribed Atorvastatin 10 mg po daily. He admits that he's not been 100% compliant with his medications. Pt c/o left foot/heel pain. He works in construction. He wears steel toe boots on a daily basis and stands on feet on concrete for the majority of the day. He climbs ladders often as well. He states that " sometimes it hurts to even put his feet on the ground to begin walking in the morning. He describes the pain as aching and burning. He's been prescribed Gabapentin in the past, which he reports helps with the pain, but he cannot take it all of the time because it causes him to be too drowsiness when he wakes for work in the am. He reports that he's tried interventions with a water bottle and tennis ball to relieve the pain to bottom of feet with minimal relief. He's also c/o a lesion to bridge of nose x many years, recently grown in size. States he had an aunt who had a similar lesion that tested + for skin cancer in the past. Works in the sun on most days. Rarely wears sun screen. Lastly, he's c/o darkening discoloration to distal lower ext. H/o cellulitis in the past in same region. Rarely swells. No calf pain or redness at this time. No other problems stated.    (12/18/2020): 40 y.o.  male with a PmHx of HTN, T2DM, HLD, plantar fasciitis, h/o COVID-19, and morbid obesity presenting for routine f/u. He's also following [facility] for right knee pain. Seen in ENT 12/16/2020. Previously referred from [facility] for BCC of nose (confirmed by bx). He had lesion excised 12/2 in ENT. States had stitches but one of them popped one day while lying down and rubbing on pillow. He presented back to ENT (not medicine clinic) requesting to have stitches removed/wound evaluated but he was told to go to ED for such. At present, lesion is scabbed.  He is to keep clean and apply Bacitracin ointment as prescribed. He apparently declined a referral for reconstructive sx. Will f/u in ENT in 2 weeks. Bp at goal. HgA1c 7.3%. Currently prescribed Metformin  mg po daily.  (goal <100). Admits not taking regularly, then, when he started taking ~1 week ago, he was taking 20 mg total of Lipitor as he had two bottles of the same medication. Pt admits that since having COVID-19 in July, he's not been taking his medications  regularly. Admits that the virus took a toll on his physical and mental health. He felt depressed being isolated from family, including , at the time. He subsequently lost his job (this had a major impact on depressed mood). Thus, he was not following his routine medical regimen or diet as a way to cope. At present, he denies SI/HI. He is finding some work off and on, and is making an effort to take medications as prescribed. He is amenable to receiving the influenza vaccine. CBC and LFTs were abnl during hospitalization with COVID-19 in July. Has not been re-assessed since. No other problems stated.    (2021): 40 y.o.  male with a PmHx of HTN, T2DM, HLD, plantar fasciitis, h/o COVID-19, and morbid obesity presenting for routine f/u. Bp at goal. HgA1c 8.1%. Currently prescribed Metformin  mg po daily. Admits stopped taking medication for a while. LDL 78 (goal <100). Prescribed Atorvastatin 10 mg po daily. Pt admits that since having COVID-19 in July, he's not been taking his medications regularly. Admits that the virus took a toll on his physical and mental health. He feels depressed due to losing his job (this had a major impact on depressed mood). Feels he can't provide for his family. Saved money is running out. Also c/o mx joint pains; specifically bilateral shoulders x years. States shoulders ache. Has trouble lifting things above his head. 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 Got denied for disability. Has re-applied; waiting for an answer. Gaining weight. Doesn't feel motivated to do much or exercise. At present, he denies SI/HI. Lastly, c/o cough productive of yellow mucous, nasal congestion, runny nose, itchy ears, crusty eyes x 2 weeks. States older daughter had symptoms first. Then a younger dtr and wife. His symptoms are lingering. No fever or chills at present but felt feverish at start of illness. No other problems  "stated.    (9/20/2021): 40 y.o.  male with a PmHx of HTN, T2DM, HLD, plantar fasciitis, h/o COVID-19, and morbid obesity presenting for routine f/u. Bp at goal. HgA1c 9.2%, increased from previous. Currently prescribed Metformin  mg po BID. Admits stopped taking medication for a while at last visit. Now, he admits skipping doses. He is not following an ADA diet. Has been eating "pizza and cakes." He is following Ortho for bilateral shoulder pain. Currently undergoing P.T. Bp elevated today. Taking Lisinopril 2.5 mg po daily. Originally started for renal protection given h/o DM. Denies HA, dizziness, weakness, numbness/tingling, CP, SOB, or any acute concerns. No other concerns.    (12/21/2021): 41 y.o.  male with a PmHx of HTN, T2DM, HLD, plantar fasciitis, h/o COVID-19, and morbid obesity presenting for routine f/u. Bp at goal. HgA1c 7.7%, improved from previous. FBG also significantly improved. Pt reports that he's making a conscious effort to make better food choices such as choosing sugar-free drinks over regular sodas. Also "cutting back" on intake overall. Currently prescribed Metformin ER 1,000 mg po BID. Tolerating medication better. Bp at goal. Declines vaccines. Following Ortho clinic for right shoulder pain. No other concerns.      Telemedicine Visit (3/22/2022): 41 y.o.  male with a PmHx of HTN, T2DM, HLD, plantar fasciitis, h/o COVID-19, and morbid obesity presenting for routine DM f/u. HgA1c 7.1%, improved from previous (9.2%, 7.7%). FBG also consistently improving from September assessment; now 140s. 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 He's been eating healthier on his new job. States eating salads. Reports losing ~20+ lbs from last visit. Total platelet count (initially elevated during COVID infection 7/2020), improving. Patient was previously instructed to start ASA 81 mg po daily, but admits forgetting to obtain. He is c/o intermittent, shocking right " "ankle pain and swelling. He has not tried any medications to relieve pain. Pain is worse with prolonged ambulation. Relieved with rest. States played sports in high school and can recall spraining ankle many times. No other concerns.    TV (7/22/2022): 41 y.o.  male with a PmHx of HTN, T2DM, HLD, plantar fasciitis, h/o COVID-19, elevated platelets (h/o splenectomy), and morbid obesity presenting for routine DM f/u. HgA1c 7.6%, worsened from previous. Prescribed Metformin 1,000 mg po BID but admits not taking as prescribed. Also reports "off of diet." One of his children had a birthday earlier this month. Admits had been eating a lot of sweets around that time. BP slightly elevated today. Asymptomatic. Prescribed Lisinopril 20 mg po daily but not taking as prescribed. Platelets improved to 404k, glucose 161,  (not taking statin), and microalb slightly elevated. He is due for eye and foot exam today. C/o intermittent throbbing, burning right foot/heel pain. 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 This exacerbates his pain. Known history of plantar fasciitis. States had CSI in the past with some relief. Uses topical Voltaren with mild relief.    TV (2/6/2023): 42 y.o.  male with a PmHx of HTN, T2DM, HLD, plantar fasciitis, h/o COVID-19, elevated platelets (h/o splenectomy), and morbid obesity presenting for routine DM f/u. At last visit, HgA1c was 7.6%, worsened from previous. Today, A1c is 12.1%. He was prescribed Metformin 1,000 mg po BID but admitted not taking as prescribed. Also reported "off of diet." Previously prescribed the ER form of Metformin due to past issues with diarrhea. He'd also declined additional medications at that time. Today, he's accompanied by his wife, Precious, who verbalizes that patient has not taken Metformin in close to one year. 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 [facility] not able to make several stops to use the restroom so he stopped medication altogether. He also stopped his Atorvastatin and Lisinopril. BP slightly elevated today. Asymptomatic.     Wife mentions that patient's sister was diagnosed with colon cancer in her late 50s. States patient has not previously reported this. Patient is denying any GI issues and is not interested in a screening colonoscopy at this time.      Diabetes  He presents for his follow-up diabetic visit. He has type 2 diabetes mellitus. His disease course has been worsening. There are no hypoglycemic associated symptoms. Associated symptoms include polydipsia and polyuria. There are no hypoglycemic complications. There are no diabetic complications. Risk factors for coronary artery disease include diabetes mellitus, dyslipidemia, hypertension, male sex, obesity, sedentary lifestyle and stress. Current diabetic treatment includes oral agent (monotherapy). He is compliant with treatment none of the time. His weight is fluctuating minimally. He is following a generally unhealthy diet. When asked about meal planning, he reported none. He has not had a previous visit with a dietitian. He rarely participates in exercise. An ACE inhibitor/angiotensin II receptor blocker is not being taken. He does not see a podiatrist.Eye exam is not current.     Review of Systems     Review of Systems   Endocrine: Positive for polydipsia and polyuria.   All other systems reviewed and are negative.    Medical / Social / Family History   History reviewed. No pertinent past medical history.    Past Surgical History:   Procedure Laterality Date    SPLENECTOMY, TOTAL         Social History    reports that he has never smoked. He has never used smokeless tobacco. He reports current alcohol use of about 2.0 standard drinks per week. He reports that he does not use drugs.    Family History  's family history  includes Colon cancer in his sister; Diabetes in his father and mother; Hypertension in his father.    Medications and Allergies     Medications  Medication List with Changes/Refills   New Medications    BLOOD SUGAR DIAGNOSTIC STRP    Use daily to check blood sugar    BLOOD-GLUCOSE METER KIT    Use daily to check blood sugar    LANCETS 23 GAUGE MISC    Use daily to check blood sugar    SEMAGLUTIDE (OZEMPIC) 0.25 MG OR 0.5 MG(2 MG/1.5 ML) PEN INJECTOR    Inject 0.25 mg into the skin every 7 days.   Current Medications    ASPIRIN (ECOTRIN) 81 MG EC TABLET    Take 81 mg by mouth Daily.    LORATADINE (CLARITIN) 10 MG TABLET    Take 10 mg by mouth Daily.    MELOXICAM (MOBIC) 15 MG TABLET    Take 15 mg by mouth daily as needed for Pain.    METHOCARBAMOL (ROBAXIN) 750 MG TAB    Take 1,500 mg by mouth every 8 (eight) hours as needed for Muscle spasms.   Changed and/or Refilled Medications    Modified Medication Previous Medication    ATORVASTATIN (LIPITOR) 10 MG TABLET atorvastatin (LIPITOR) 10 MG tablet       Take 1 tablet (10 mg total) by mouth every evening.    Take 1 tablet (10 mg total) by mouth every evening.    LISINOPRIL (PRINIVIL,ZESTRIL) 20 MG TABLET lisinopriL (PRINIVIL,ZESTRIL) 20 MG tablet       Take 1 tablet (20 mg total) by mouth once daily.    Take 1 tablet (20 mg total) by mouth once daily.   Discontinued Medications    DICLOFENAC SODIUM (VOLTAREN) 1 % GEL    Apply topically 4 (four) times daily as needed (pain).    METFORMIN (GLUCOPHAGE-XR) 500 MG ER 24HR TABLET    Take 2 tablets (1,000 mg total) by mouth 2 (two) times daily.       Allergies  Review of patient's allergies indicates:  No Known Allergies    Physical Examination     Vitals:    02/06/23 1355   BP: (!) 144/80   Pulse:    Resp:    Temp:      Physical Exam  Constitutional:       Appearance: Normal appearance. He is obese.   HENT:      Head: Normocephalic and atraumatic.      Nose: Nose normal.   Eyes:      Extraocular Movements: Extraocular  movements intact.   Cardiovascular:      Rate and Rhythm: Normal rate and regular rhythm.      Pulses: Normal pulses.      Heart sounds: Normal heart sounds.   Pulmonary:      Effort: Pulmonary effort is normal.      Breath sounds: Normal breath sounds.   Abdominal:      General: Abdomen is protuberant.   Musculoskeletal:         General: Normal range of motion.      Cervical back: Normal range of motion.      Right lower leg: No edema.      Left lower leg: No edema.   Skin:     General: Skin is warm and dry.   Neurological:      Mental Status: He is alert and oriented to person, place, and time.   Psychiatric:         Mood and Affect: Mood normal.         Behavior: Behavior normal.         Thought Content: Thought content normal.         Judgment: Judgment normal.         Results     Lab Results   Component Value Date    WBC 10.6 07/18/2022    RBC 5.55 07/18/2022    HGB 15.2 07/18/2022    HCT 50.2 07/18/2022    MCV 90.5 07/18/2022    MCH 27.4 07/18/2022    MCHC 30.3 (L) 07/18/2022    RDW 14.2 07/18/2022     (H) 07/18/2022    MPV 9.5 07/18/2022     CMP  Sodium Level   Date Value Ref Range Status   07/18/2022 140 136 - 145 mmol/L Final     Potassium Level   Date Value Ref Range Status   07/18/2022 4.2 3.5 - 5.1 mmol/L Final     Carbon Dioxide   Date Value Ref Range Status   07/18/2022 32 (H) 22 - 29 mmol/L Final     Blood Urea Nitrogen   Date Value Ref Range Status   07/18/2022 18.8 8.9 - 20.6 mg/dL Final     Creatinine   Date Value Ref Range Status   07/18/2022 0.79 0.73 - 1.18 mg/dL Final     Calcium Level Total   Date Value Ref Range Status   07/18/2022 9.6 8.4 - 10.2 mg/dL Final     Albumin Level   Date Value Ref Range Status   07/18/2022 4.0 3.5 - 5.0 gm/dL Final     Bilirubin Total   Date Value Ref Range Status   07/18/2022 0.5 <=1.5 mg/dL Final     Alkaline Phosphatase   Date Value Ref Range Status   07/18/2022 72 40 - 150 unit/L Final     Aspartate Aminotransferase   Date Value Ref Range Status    07/18/2022 21 5 - 34 unit/L Final     Alanine Aminotransferase   Date Value Ref Range Status   07/18/2022 39 0 - 55 unit/L Final     Estimated GFR-Non    Date Value Ref Range Status   07/18/2022 >60 mls/min/1.73/m2 Final     Lab Results   Component Value Date    CHOL 179 07/18/2022     Lab Results   Component Value Date    HDL 41 07/18/2022     No results found for: LDLCALC  Lab Results   Component Value Date    TRIG 95 07/18/2022     No results found for: CHOLHDL  Lab Results   Component Value Date    TSH 2.2610 07/18/2022     Lab Results   Component Value Date    PHUR 7.0 06/11/2021    PROTEINUA Trace (A) 07/22/2022    GLUCUA Negative 06/11/2021    KETONESU Negative 06/11/2021    OCCULTUA Negative 06/11/2021    NITRITE Negative 06/11/2021    LEUKOCYTESUR Negative 07/22/2022           Assessment and Plan (including Health Maintenance)     Plan:         Health Maintenance Due   Topic Date Due    Eye Exam  Never done    Low Dose Statin  Never done    COVID-19 Vaccine (3 - Booster for Noah series) 01/24/2022       Problem List Items Addressed This Visit          Cardiac/Vascular    Hyperlipidemia    Overview     Atorvastatin 10 mg po daily         Current Assessment & Plan     Resume Atorvastatin 10 mg po daily  Educated on a low-fat, low-cholesterol diet and aerobic exercise (20-30 mins/day x 5 days a week). Encouraged healthy fruits and veggie intake. Increase water intake. Avoid excess ETOH intake and smoking         Relevant Medications    atorvastatin (LIPITOR) 10 MG tablet    Other Relevant Orders    Comprehensive Metabolic Panel    Lipid Panel    Hypertension    Overview     Lisinopril 20 mg po daily         Current Assessment & Plan     Elevated today; asymptomatic. Not taking medication  Resume Lisinopril  Educated on aerobic exercise (3-5 days/week) and a low-fat, low-sodium diet  Avoid excess ETOH consumption. Smoking cessation if applicable  ED precautions (s/s of CVA, etc)                Relevant Medications    lisinopriL (PRINIVIL,ZESTRIL) 20 MG tablet       Oncology    Family history of colon cancer    Current Assessment & Plan     Declines colon cancer screening            Endocrine    Type 2 diabetes mellitus with hyperglycemia - Primary    Overview     A1c level: 12.1% (2/6/23); 7.6% (7/2022), goal < 7%  CBG trends: none provided  Educated on diabetic diet: Low-fat, Low-carb, Low-cholesterol. Avoid sugary/dark drinks/sodas and white foods (i.e., bread, pasta, potatoes, rice)  Aerobic exercise: 20-30 min/day x 5 days/week  Diabetic Eye Exam: 7/22/22  Diabetic Foot Exam: 7/22/22  Microalbumin-U: elevated (will monitor)  Kidney Protection: Lisinopril  Statin Therapy: Atorvastatin  Educated on Hypoglycemic s/s and interventions. Call office with any questions or concerns  Strict glucose monitoring. Bring blood glucose logs/meter to each OV           Current Assessment & Plan     Patient defers Metformin 2/2 GI SE despite ER formulation  Will try Ozempic 0.25mg SQ weekly x 4, then increase to 0.5mg SQ weekly. Denies personal or Fhx of thyroid cancer. Call w/ lump in throat, hoarseness, cough, trouble swallowing, etc  Resume statin and ACE-I  Long discussion about making better food choices. Ate Chinese buffet for lunch, drinks diet Emy daily  F/u 2 mths with labs         Relevant Medications    semaglutide (OZEMPIC) 0.25 mg or 0.5 mg(2 mg/1.5 mL) pen injector    blood sugar diagnostic Strp    blood-glucose meter kit    lancets 23 gauge Misc       Other    Wellness examination     At conclusion of visit, patient reported that a thorn went through his right boot yesterday while squirrel hunting. Assessed plantar region of right foot which did not reveal any puncture marks. Patient informed to use caution when outdoors or indoors given worsening diabetes    He declines colon cancer screening at this time despite family hx.    Health Maintenance Topics with due status: Not Due       Topic Last  Completion Date    Pneumococcal Vaccines (Age 0-64) 06/23/2015    TETANUS VACCINE 09/11/2017    Lipid Panel 07/18/2022    Diabetes Urine Screening 07/22/2022    Foot Exam 07/22/2022    Hemoglobin A1c 02/06/2023       Future Appointments   Date Time Provider Department Center   4/14/2023  8:30 AM HERACLIO Tolliver [facility]      I spent a total of 45 minutes on the day of the visit.  This includes face to face time and non-face to face time preparing to see the patient (eg, review of tests), obtaining and/or reviewing separately obtained history, documenting clinical information in the electronic or other health record, independently interpreting results and communicating results to the patient/family/caregiver, or care coordinator.          Signature:  HERACLIO Tolliver  [facility]  [address] [facility] [address]    Date of encounter: 2/6/23

## 2023-02-06 NOTE — ASSESSMENT & PLAN NOTE
Elevated today; asymptomatic. Not taking medication  Resume Lisinopril  Educated on aerobic exercise (3-5 days/week) and a low-fat, low-sodium diet  Avoid excess ETOH consumption. Smoking cessation if applicable  ED precautions (s/s of CVA, etc)

## 2023-02-06 NOTE — ASSESSMENT & PLAN NOTE
Patient defers Metformin 2/2 GI SE despite ER formulation  Will try Ozempic 0.25mg SQ weekly x 4, then increase to 0.5mg SQ weekly. Denies personal or Fhx of thyroid cancer. Call w/ lump in throat, hoarseness, cough, trouble swallowing, etc  Resume statin and ACE-I  Long discussion about making better food choices. Ate Chinese buffet for lunch, drinks diet Emy daily  F/u 2 mths with labs

## 2023-03-07 ENCOUNTER — TELEPHONE (OUTPATIENT)
Dept: INTERNAL MEDICINE | Facility: CLINIC | Age: 43
End: 2023-03-07
Payer: MEDICAID

## 2023-03-07 NOTE — TELEPHONE ENCOUNTER
Pt spouse called and stated that she had a few questions for the nurse. She stated that she just realize that he has been taking his ozempic wrong and has a few questions

## 2023-03-24 DIAGNOSIS — E11.65 TYPE 2 DIABETES MELLITUS WITH HYPERGLYCEMIA, WITHOUT LONG-TERM CURRENT USE OF INSULIN: ICD-10-CM

## 2023-03-24 DIAGNOSIS — Z80.0 FAMILY HISTORY OF COLON CANCER: Primary | ICD-10-CM

## 2023-03-24 RX ORDER — PEN NEEDLE, DIABETIC 29 G X1/2"
NEEDLE, DISPOSABLE MISCELLANEOUS
Status: CANCELLED | OUTPATIENT
Start: 2023-03-24

## 2023-03-27 RX ORDER — PEN NEEDLE, DIABETIC 32 GX 1/6"
NEEDLE, DISPOSABLE MISCELLANEOUS
Qty: 100 EACH | Refills: 1 | Status: SHIPPED | OUTPATIENT
Start: 2023-03-27 | End: 2023-05-03

## 2023-03-27 NOTE — TELEPHONE ENCOUNTER
Please confirm this with the pharmacy. To my understanding, the pen needles are included in the pen pack. If we have to prescribe outside of the pen pack, I need to know the specific type of needle.

## 2023-05-03 ENCOUNTER — OFFICE VISIT (OUTPATIENT)
Dept: INTERNAL MEDICINE | Facility: CLINIC | Age: 43
End: 2023-05-03
Payer: MEDICAID

## 2023-05-03 VITALS
WEIGHT: 315 LBS | HEART RATE: 77 BPM | RESPIRATION RATE: 20 BRPM | DIASTOLIC BLOOD PRESSURE: 77 MMHG | SYSTOLIC BLOOD PRESSURE: 118 MMHG | HEIGHT: 66 IN | TEMPERATURE: 98 F | BODY MASS INDEX: 50.62 KG/M2

## 2023-05-03 DIAGNOSIS — Z00.00 WELLNESS EXAMINATION: ICD-10-CM

## 2023-05-03 DIAGNOSIS — E11.65 TYPE 2 DIABETES MELLITUS WITH HYPERGLYCEMIA, WITHOUT LONG-TERM CURRENT USE OF INSULIN: Primary | ICD-10-CM

## 2023-05-03 DIAGNOSIS — Z12.5 SCREENING FOR PROSTATE CANCER: ICD-10-CM

## 2023-05-03 DIAGNOSIS — M75.31 CALCIFIC TENDINITIS OF RIGHT SHOULDER: ICD-10-CM

## 2023-05-03 DIAGNOSIS — E78.5 HYPERLIPIDEMIA, UNSPECIFIED HYPERLIPIDEMIA TYPE: ICD-10-CM

## 2023-05-03 PROBLEM — M79.671 RIGHT FOOT PAIN: Status: RESOLVED | Noted: 2022-07-22 | Resolved: 2023-05-03

## 2023-05-03 PROCEDURE — 1159F MED LIST DOCD IN RCRD: CPT | Mod: CPTII,,, | Performed by: NURSE PRACTITIONER

## 2023-05-03 PROCEDURE — 1159F PR MEDICATION LIST DOCUMENTED IN MEDICAL RECORD: ICD-10-PCS | Mod: CPTII,,, | Performed by: NURSE PRACTITIONER

## 2023-05-03 PROCEDURE — 3008F PR BODY MASS INDEX (BMI) DOCUMENTED: ICD-10-PCS | Mod: CPTII,,, | Performed by: NURSE PRACTITIONER

## 2023-05-03 PROCEDURE — 1160F PR REVIEW ALL MEDS BY PRESCRIBER/CLIN PHARMACIST DOCUMENTED: ICD-10-PCS | Mod: CPTII,,, | Performed by: NURSE PRACTITIONER

## 2023-05-03 PROCEDURE — 99214 PR OFFICE/OUTPT VISIT, EST, LEVL IV, 30-39 MIN: ICD-10-PCS | Mod: S$PBB,,, | Performed by: NURSE PRACTITIONER

## 2023-05-03 PROCEDURE — 4010F PR ACE/ARB THEARPY RXD/TAKEN: ICD-10-PCS | Mod: CPTII,,, | Performed by: NURSE PRACTITIONER

## 2023-05-03 PROCEDURE — 3046F PR MOST RECENT HEMOGLOBIN A1C LEVEL > 9.0%: ICD-10-PCS | Mod: CPTII,,, | Performed by: NURSE PRACTITIONER

## 2023-05-03 PROCEDURE — 4010F ACE/ARB THERAPY RXD/TAKEN: CPT | Mod: CPTII,,, | Performed by: NURSE PRACTITIONER

## 2023-05-03 PROCEDURE — 99215 OFFICE O/P EST HI 40 MIN: CPT | Mod: PBBFAC | Performed by: NURSE PRACTITIONER

## 2023-05-03 PROCEDURE — 3008F BODY MASS INDEX DOCD: CPT | Mod: CPTII,,, | Performed by: NURSE PRACTITIONER

## 2023-05-03 PROCEDURE — 99214 OFFICE O/P EST MOD 30 MIN: CPT | Mod: S$PBB,,, | Performed by: NURSE PRACTITIONER

## 2023-05-03 PROCEDURE — 1160F RVW MEDS BY RX/DR IN RCRD: CPT | Mod: CPTII,,, | Performed by: NURSE PRACTITIONER

## 2023-05-03 PROCEDURE — 3046F HEMOGLOBIN A1C LEVEL >9.0%: CPT | Mod: CPTII,,, | Performed by: NURSE PRACTITIONER

## 2023-05-03 PROCEDURE — 3078F DIAST BP <80 MM HG: CPT | Mod: CPTII,,, | Performed by: NURSE PRACTITIONER

## 2023-05-03 PROCEDURE — 3074F PR MOST RECENT SYSTOLIC BLOOD PRESSURE < 130 MM HG: ICD-10-PCS | Mod: CPTII,,, | Performed by: NURSE PRACTITIONER

## 2023-05-03 PROCEDURE — 3074F SYST BP LT 130 MM HG: CPT | Mod: CPTII,,, | Performed by: NURSE PRACTITIONER

## 2023-05-03 PROCEDURE — 3078F PR MOST RECENT DIASTOLIC BLOOD PRESSURE < 80 MM HG: ICD-10-PCS | Mod: CPTII,,, | Performed by: NURSE PRACTITIONER

## 2023-05-03 RX ORDER — LANCETS 33 GAUGE
EACH MISCELLANEOUS
COMMUNITY
Start: 2023-02-06 | End: 2023-11-29 | Stop reason: SDUPTHER

## 2023-05-03 RX ORDER — SEMAGLUTIDE 1.34 MG/ML
1 INJECTION, SOLUTION SUBCUTANEOUS
Qty: 3 ML | Refills: 5 | Status: SHIPPED | OUTPATIENT
Start: 2023-05-03 | End: 2023-08-23 | Stop reason: SDUPTHER

## 2023-05-03 RX ORDER — SEMAGLUTIDE 0.68 MG/ML
INJECTION, SOLUTION SUBCUTANEOUS
COMMUNITY
Start: 2023-04-04 | End: 2023-05-03 | Stop reason: DRUGHIGH

## 2023-05-03 RX ORDER — PEN NEEDLE, DIABETIC 32 GX 1/4"
NEEDLE, DISPOSABLE MISCELLANEOUS
COMMUNITY
Start: 2023-03-27

## 2023-05-03 RX ORDER — LANCETS 30 GAUGE
EACH MISCELLANEOUS
COMMUNITY
Start: 2023-02-06

## 2023-05-03 NOTE — ASSESSMENT & PLAN NOTE
LDL now at goal  Continue Atorvastatin  Educated on a low-fat, low-cholesterol diet and aerobic exercise (20-30 mins/day x 5 days a week). Encouraged healthy fruits and veggie intake. Increase water intake. Avoid excess ETOH intake and smoking

## 2023-05-03 NOTE — ASSESSMENT & PLAN NOTE
Offered to re-refer to [facility], but unsure if he can take off to attend. Will speak to supervisor and let me know of his plans  Mobic PRN

## 2023-05-03 NOTE — PROGRESS NOTES
"HERACLIO Tolliver   [facility]  [address] [facility] [id number]      PATIENT NAME: Dimas Sales  : 1980  DATE: 5/3/23  MRN: [id number]        Reason for Visit / Chief Complaint: Follow-up (Lab review)       History of Present Illness / Problem Focused Workflow     Dimas Sales presents to the clinic with Follow-up (Lab review)     Initial Visit (2018): 38 y.o.  male presenting to the clinic to re-establish primary care. Previous PCP ANALIA Dallas NP. Last OV 2018. PMHx significant for HTN, T2DM, and HLD. Bp at goal. Currently taking Lisinopril 2.5 mg po daily. HgA1c 6.4% (2018). Currently taking Metformin  mg po daily. Denies s/s of hyper/hypoglycemia or neuropathy. Pt admits that recent eating habits have been poor. LDL 66. Currently taking Atorvastatin 10 mg po daily. Tolerating well. Working on losing weight. Took Adipex in the past with good results, however, he gained all weight back plus more after stopping drug. Wants to try to lose weight naturally. Denies fever, chills, HA, CP, SOB, or any other concerns today.     (2019): 38 y.o.  male presenting to the clinic for routine f/u. PMHx significant for HTN, T2DM, and HLD. Bp at goal. Currently taking Lisinopril 2.5 mg po daily. HgA1c 6.5%. Currently taking Metformin  mg po daily. Denies s/s of hyper/hypoglycemia. Admits occasional "shooting/burning" pain in BLE and feet. LDL 77. Currently taking Atorvastatin 10 mg po daily. Tolerating well. Pt still actively trying to lose weight. C/o right knee pain. Reports that he was at work last week and went to move a piece of iron by pushing it with the inner aspect of right foot, now, pt states that he believes he strained his right knee. C/o moderate, aching right knee pain. Denies erythema or joint swelling. Pain exacerbated with bending and extending right knee. Relieved with " "rest. Occasionally associated with "burning" sensation down right leg. Has not tried any remedies for pain. Denies fever, chills, HA, CP, SOB, or any other concerns today.     (6/24/19): 38 y.o.  male presenting to the clinic for routine f/u HTN, T2DM, and HLD. Now following [facility] for right knee pain. HgA1c 6.4%. Currently taking Metformin  mg po daily. Denies s/s of hyper/hypoglycemia. LDL 95. Currently taking Atorvastatin 10 mg po daily. Requesting medication refill. Today, pt c/o right shoulder pain. Reports hx of fall that sparked right shoulder pain. Pain would only occur on occasion are was largely controlled following the fall. However, pt reports an increase in pain over the last several weeks. He works in construction and admits heavy lifting, operating heavy machinery, and climbing. Pain reported as a throbbing pain that is moderate to severe in intensity. Often travels down right arm. Associated with numbness and tingling to distal right arm (also occurs in distal left arm). Exacerbated by strenuous activity. Relieved with NSAIDs and Gabapentin as needed. States, "I might need an injection in my shoulder." Denies CP or SOB. No other problems stated.     (6/12/2020): 39 y.o.  male with a PmHx of HTN, T2DM, HLD, and morbid obesity presenting for routine f/u. He's also following [facility] for right knee pain. Bp at goal. HgA1c 6.5%. Currently taking Metformin  mg po daily. Tolerating well. Denies s/s of hyper/hypoglycemia. . Currently prescribed Atorvastatin 10 mg po daily. He admits that he's not been 100% compliant with his medications. Pt c/o left foot/heel pain. He works in construction. He wears steel toe boots on a daily basis and stands on feet on concrete for the majority of the day. He climbs ladders often as well. He states that sometimes it hurts to even put his feet on the ground to begin walking in the morning. He describes the pain as aching and burning. He's been " prescribed Gabapentin in the past, which he reports helps with the pain, but he cannot take it all of the time because it causes him to be too drowsiness when he wakes for work in the am. He reports that he's tried interventions with a water bottle and tennis ball to relieve the pain to bottom of feet with minimal relief. He's also c/o a lesion to bridge of nose x many years, recently grown in size. States he had an aunt who had a similar lesion that tested + for skin cancer in the past. Works in the sun on most days. Rarely wears sun screen. Lastly, he's c/o darkening discoloration to distal lower ext. H/o cellulitis in the past in same region. Rarely swells. No calf pain or redness at this time. No other problems stated.     (12/18/2020): 40 y.o.  male with a PmHx of HTN, T2DM, HLD, plantar fasciitis, h/o COVID-19, and morbid obesity presenting for routine f/u. He's also following [facility] for right knee pain. Seen in ENT 12/16/2020. Previously referred from [facility] for BCC of nose (confirmed by bx). He had lesion excised 12/2 in ENT. States had stitches but one of them popped one day while lying down and rubbing on pillow. He presented back to ENT (not medicine clinic) requesting to have stitches removed/wound evaluated but he was told to go to ED for such. At present, lesion is scabbed.  He is to keep clean and apply Bacitracin ointment as prescribed. He apparently declined a referral for reconstructive sx. Will f/u in ENT in 2 weeks. Bp at goal. HgA1c 7.3%. Currently prescribed Metformin  mg po daily.  (goal <100). Admits not taking regularly, then, when he started taking ~1 week ago, he was taking 20 mg total of Lipitor as he had two bottles of the same medication. Pt admits that since having COVID-19 in July, he's not been taking his medications regularly. Admits that the virus took a toll on his physical and mental health. He felt depressed being isolated from family, including  , at the time. He subsequently lost his job (this had a major impact on depressed mood). Thus, he was not following his routine medical regimen or diet as a way to cope. At present, he denies SI/HI. He is finding some work off and on, and is making an effort to take medications as prescribed. He is amenable to receiving the influenza vaccine. CBC and LFTs were abnl during hospitalization with COVID-19 in July. Has not been re-assessed since. No other problems stated.     (2021): 40 y.o.  male with a PmHx of HTN, T2DM, HLD, plantar fasciitis, h/o COVID-19, and morbid obesity presenting for routine f/u. Bp at goal. HgA1c 8.1%. Currently prescribed Metformin  mg po daily. Admits stopped taking medication for a while. LDL 78 (goal <100). Prescribed Atorvastatin 10 mg po daily. Pt admits that since having COVID-19 in July, he's not been taking his medications regularly. Admits that the virus took a toll on his physical and mental health. He feels depressed due to losing his job (this had a major impact on depressed mood). Feels he can't provide for his family. Saved money is running out. Also c/o mx joint pains; specifically bilateral shoulders x years. States shoulders ache. Has trouble lifting things above his head. 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 Got denied for disability. Has re-applied; waiting for an answer. Gaining weight. Doesn't feel motivated to do much or exercise. At present, he denies SI/HI. Lastly, c/o cough productive of yellow mucous, nasal congestion, runny nose, itchy ears, crusty eyes x 2 weeks. States older daughter had symptoms first. Then a younger dtr and wife. His symptoms are lingering. No fever or chills at present but felt feverish at start of illness. No other problems stated.     (2021): 40 y.o.  male with a PmHx of HTN, T2DM, HLD, plantar fasciitis, h/o COVID-19, and morbid obesity  "presenting for routine f/u. Bp at goal. HgA1c 9.2%, increased from previous. Currently prescribed Metformin  mg po BID. Admits stopped taking medication for a while at last visit. Now, he admits skipping doses. He is not following an ADA diet. Has been eating "pizza and cakes." He is following Ortho for bilateral shoulder pain. Currently undergoing P.T. Bp elevated today. Taking Lisinopril 2.5 mg po daily. Originally started for renal protection given h/o DM. Denies HA, dizziness, weakness, numbness/tingling, CP, SOB, or any acute concerns. No other concerns.     (12/21/2021): 41 y.o.  male with a PmHx of HTN, T2DM, HLD, plantar fasciitis, h/o COVID-19, and morbid obesity presenting for routine f/u. Bp at goal. HgA1c 7.7%, improved from previous. FBG also significantly improved. Pt reports that he's making a conscious effort to make better food choices such as choosing sugar-free drinks over regular sodas. Also "cutting back" on intake overall. Currently prescribed Metformin ER 1,000 mg po BID. Tolerating medication better. Bp at goal. Declines vaccines. Following Ortho clinic for right shoulder pain. No other concerns.      Telemedicine Visit (3/22/2022): 41 y.o.  male with a PmHx of HTN, T2DM, HLD, plantar fasciitis, h/o COVID-19, and morbid obesity presenting for routine DM f/u. HgA1c 7.1%, improved from previous (9.2%, 7.7%). FBG also consistently improving from September assessment; now 140s. 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 He's been eating healthier on his new job. States eating salads. Reports losing ~20+ lbs from last visit. Total platelet count (initially elevated during COVID infection 7/2020), improving. Patient was previously instructed to start ASA 81 mg po daily, but admits forgetting to obtain. He is c/o intermittent, shocking right ankle pain and swelling. He has not tried any medications to relieve pain. Pain is worse with prolonged ambulation. Relieved with " "rest. States played sports in high school and can recall spraining ankle many times. No other concerns.     TV (7/22/2022): 41 y.o.  male with a PmHx of HTN, T2DM, HLD, plantar fasciitis, h/o COVID-19, elevated platelets (h/o splenectomy), and morbid obesity presenting for routine DM f/u. HgA1c 7.6%, worsened from previous. Prescribed Metformin 1,000 mg po BID but admits not taking as prescribed. Also reports "off of diet." One of his children had a birthday earlier this month. Admits had been eating a lot of sweets around that time. BP slightly elevated today. Asymptomatic. Prescribed Lisinopril 20 mg po daily but not taking as prescribed. Platelets improved to 404k, glucose 161,  (not taking statin), and microalb slightly elevated. He is due for eye and foot exam today. C/o intermittent throbbing, burning right foot/heel pain. 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 This exacerbates his pain. Known history of plantar fasciitis. States had CSI in the past with some relief. Uses topical Voltaren with mild relief.     TV (2/6/2023): 42 y.o.  male with a PmHx of HTN, T2DM, HLD, plantar fasciitis, h/o COVID-19, elevated platelets (h/o splenectomy), and morbid obesity presenting for routine DM f/u. At last visit, HgA1c was 7.6%, worsened from previous. Today, A1c is 12.1%. He was prescribed Metformin 1,000 mg po BID but admitted not taking as prescribed. Also reported "off of diet." Previously prescribed the ER form of Metformin due to past issues with diarrhea. He'd also declined additional medications at that time. Today, he's accompanied by his wife, Precious, who verbalizes that patient has not taken Metformin in close to one year. 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 States not able to make several stops to use the restroom so he stopped " medication altogether. He also stopped his Atorvastatin and Lisinopril. BP slightly elevated today. Asymptomatic.      Wife mentions that patient's sister was diagnosed with colon cancer in her late 50s. States patient has not previously reported this. Patient is denying any GI issues and is not interested in a screening colonoscopy at this time.        Diabetes  He presents for his follow-up diabetic visit. He has type 2 diabetes mellitus. His disease course has been worsening. There are no hypoglycemic associated symptoms. Associated symptoms include polydipsia and polyuria. There are no hypoglycemic complications. There are no diabetic complications. Risk factors for coronary artery disease include diabetes mellitus, dyslipidemia, hypertension, male sex, obesity, sedentary lifestyle and stress. Current diabetic treatment includes oral agent (monotherapy). He is compliant with treatment none of the time. His weight is fluctuating minimally. He is following a generally unhealthy diet. When asked about meal planning, he reported none. He has not had a previous visit with a dietitian. He rarely participates in exercise. An ACE inhibitor/angiotensin II receptor blocker is not being taken. He does not see a podiatrist.Eye exam is not current.     5/3/23: Dimas is presenting for DM f/u. He was started on Ozempic at last visit. Currently taking 0.5 mg SQ weekly. Needs refill. He has lost 14 lbs since last visit. HgA1c 8.9%; previously 12.1%. [facility] average home CBGs around 120s. LDL 70s, decreased from one-teens in July 2022. He admits that he is not eating as healthy as he was a few months ago, but is trying to get back on track. Otherwise, he is pleased with the Ozempic. He denies any GI complaints or throat complaints.     He does briefly mention recurrent right shoulder pain that was evaluated in the past with XR and Ortho consult and for which he attended P.T. for. Believes his new occupation, which involves  "driving a bus, is contributing to the recurrent discomfort. But admits not using the Mobic as prescribed and not sure he has time to pursue P.T. again at this time. He does have FROM of RUST.    He also reports continued aching pain to feet. Has been diagnosed with plantar fasciitis in the past. States feels similar. He is stretching with some relief. Interested in diabetic shoes.    No other concerns.       Review of Systems     Review of Systems   Constitutional: Negative.    HENT: Negative.     Eyes: Negative.    Respiratory: Negative.     Cardiovascular: Negative.    Gastrointestinal: Negative.    Endocrine: Negative.    Genitourinary: Negative.    Musculoskeletal:  Positive for arthralgias.   Skin: Negative.    Allergic/Immunologic: Negative.    Neurological: Negative.    Hematological: Negative.    Psychiatric/Behavioral: Negative.       Medical / Social / Family History   History reviewed. No pertinent past medical history.    Past Surgical History:   Procedure Laterality Date    SPLENECTOMY, TOTAL         Social History    reports that he has never smoked. He has never been exposed to tobacco smoke. He has never used smokeless tobacco. He reports current alcohol use of about 2.0 standard drinks per week. He reports that he does not use drugs.    Family History  's family history includes Cancer in his sister; Colon cancer in his sister; Diabetes in his father and mother; Hypertension in his father.    Medications and Allergies     Medications  Current Outpatient Medications   Medication Instructions    aspirin (ECOTRIN) 81 mg, Oral, Daily    atorvastatin (LIPITOR) 10 mg, Oral, Nightly    BD ULTRA-FINE MICRO PEN NEEDLE 32 gauge x 1/4" Ndle Subcutaneous    blood sugar diagnostic Strp Use daily to check blood sugar    lisinopriL (PRINIVIL,ZESTRIL) 20 mg, Oral, Daily    loratadine (CLARITIN) 10 mg, Oral, Daily    meloxicam (MOBIC) 15 mg, Oral, Daily PRN    methocarbamoL (ROBAXIN) 1,500 mg, Oral, Every 8 " "hours PRN    ONETOUCH DELICA PLUS LANCET 30 gauge Misc USE 1 LANCET DAILY    ONETOUCH ULTRA2 METER Misc SMARTSIG:Via Meter Daily    OZEMPIC 1 mg, Subcutaneous, Every 7 days       Allergies  Review of patient's allergies indicates:  No Known Allergies    Physical Examination   Visit Vitals  /77 (BP Location: Left arm, Patient Position: Sitting, BP Method: Large (Automatic))   Pulse 77   Temp 97.9 °F (36.6 °C) (Oral)   Resp 20   Ht 5' 6" (1.676 m)   Wt (!) 164.5 kg (362 lb 9.6 oz)   BMI 58.53 kg/m²     Physical Exam  Constitutional:       Appearance: Normal appearance. He is obese.   HENT:      Head: Normocephalic and atraumatic.      Right Ear: External ear normal.      Left Ear: External ear normal.   Eyes:      Extraocular Movements: Extraocular movements intact.   Cardiovascular:      Rate and Rhythm: Normal rate and regular rhythm.   Pulmonary:      Effort: Pulmonary effort is normal.   Musculoskeletal:         General: Normal range of motion.      Right lower leg: No edema.      Left lower leg: No edema.   Skin:     General: Skin is warm and dry.   Neurological:      General: No focal deficit present.      Mental Status: He is alert and oriented to person, place, and time.   Psychiatric:         Mood and Affect: Mood normal.         Behavior: Behavior normal.         Thought Content: Thought content normal.         Judgment: Judgment normal.         Results     Chemistry:  Lab Results   Component Value Date     05/03/2023    K 4.4 05/03/2023    CHLORIDE 103 05/03/2023    BUN 14.8 05/03/2023    CREATININE 0.79 05/03/2023    EGFRNORACEVR >60 05/03/2023    GLUCOSE 160 (H) 05/03/2023    CALCIUM 9.6 05/03/2023    ALKPHOS 75 05/03/2023    LABPROT 7.5 05/03/2023    ALBUMIN 4.0 05/03/2023    BILIDIR 0.3 03/16/2022    IBILI 0.30 03/16/2022    AST 16 05/03/2023    ALT 26 05/03/2023    MG 2.6 07/16/2020    PHOS 3.9 07/16/2020    ZPNBHXVN17AY 29.2 (L) 07/18/2022        Lab Results   Component Value Date    " HGBA1C 8.9 (H) 05/03/2023        Hematology:  Lab Results   Component Value Date    WBC 10.6 07/18/2022    HGB 15.2 07/18/2022    HCT 50.2 07/18/2022     (H) 07/18/2022       Lipid Panel:  Lab Results   Component Value Date    CHOL 128 05/03/2023    HDL 38 05/03/2023    LDL 73.00 05/03/2023    TRIG 87 05/03/2023    TOTALCHOLEST 3 05/03/2023        Urine:  Lab Results   Component Value Date    COLORUA Yellow 07/22/2022    APPEARANCEUA Clear 07/22/2022    SGUA 1.033 07/22/2022    PHUA 6.0 07/22/2022    PROTEINUA Trace (A) 07/22/2022    GLUCOSEUA Normal 07/22/2022    KETONESUA Negative 07/22/2022    BLOODUA Negative 07/22/2022    NITRITESUA Negative 07/22/2022    LEUKOCYTESUR Negative 07/22/2022    RBCUA 0-5 07/22/2022    WBCUA 0-5 07/22/2022    BACTERIA None Seen 07/22/2022    SQEPUA 2-20 06/11/2021    HYALINECASTS 0-2 (A) 07/22/2022    CREATRANDUR 201.8 (H) 05/03/2023          Assessment        ICD-10-CM ICD-9-CM   1. Type 2 diabetes mellitus with hyperglycemia, without long-term current use of insulin  E11.65 250.00     790.29   2. Hyperlipidemia, unspecified hyperlipidemia type  E78.5 272.4   3. Calcific tendinitis of right shoulder  M75.31 726.11   4. Screening for prostate cancer  Z12.5 V76.44   5. Wellness examination  Z00.00 V70.0        Plan (including Health Maintenance)     Problem List Items Addressed This Visit          Cardiac/Vascular    Hyperlipidemia    Overview     Atorvastatin 10 mg po daily           Current Assessment & Plan     LDL now at goal  Continue Atorvastatin  Educated on a low-fat, low-cholesterol diet and aerobic exercise (20-30 mins/day x 5 days a week). Encouraged healthy fruits and veggie intake. Increase water intake. Avoid excess ETOH intake and smoking                Endocrine    Type 2 diabetes mellitus with hyperglycemia - Primary    Overview     A1c level: 8.9% (5/3/23), 12.1% (2/6/23); 7.6% (7/2022), goal < 7%  CBG trends: none provided  Educated on diabetic diet:  Low-fat, Low-carb, Low-cholesterol. Avoid sugary/dark drinks/sodas and white foods (i.e., bread, pasta, potatoes, rice)  Aerobic exercise: 20-30 min/day x 5 days/week  Diabetic Eye Exam: 7/22/22  Diabetic Foot Exam: 7/22/22  Microalbumin-U: elevated (will monitor)  Kidney Protection: Lisinopril  Statin Therapy: Atorvastatin  Educated on Hypoglycemic s/s and interventions. Call office with any questions or concerns  Strict glucose monitoring. Bring blood glucose logs/meter to each OV             Current Assessment & Plan     Increase Ozempic to 1 mg SQ weekly  Improve diet  F/u 3 months           Relevant Medications    semaglutide (OZEMPIC) 1 mg/dose (4 mg/3 mL)    Other Relevant Orders    DIABETIC SHOES FOR HOME USE    Ambulatory referral/consult to Outpatient Case Management    CBC Auto Differential    Comprehensive Metabolic Panel    TSH    Vitamin D    Hemoglobin A1C       Orthopedic    Calcific tendinitis of right shoulder    Current Assessment & Plan     Offered to re-refer to P.T., but unsure if he can take off to attend. Will speak to supervisor and let me know of his plans  Mobic PRN              Other    Wellness examination    Relevant Orders    CBC Auto Differential    Comprehensive Metabolic Panel    TSH    Vitamin D     Other Visit Diagnoses       Screening for prostate cancer        Relevant Orders    PSA, Screening              Health Maintenance Due   Topic Date Due    Eye Exam  Never done    COVID-19 Vaccine (3 - Booster for Noah series) 01/24/2022       Future Appointments   Date Time Provider Department Center   8/8/2023  7:30 AM HERACLIO Tolliver [facility]        Follow up in about 3 months (around 8/3/2023) for DM F/u.    Signature:  HERACLIO Tolliver  [facility]  [address] [facility] [address]    Date of encounter: 5/3/23

## 2023-05-04 ENCOUNTER — PATIENT OUTREACH (OUTPATIENT)
Dept: ADMINISTRATIVE | Facility: OTHER | Age: 43
End: 2023-05-04
Payer: MEDICAID

## 2023-05-04 NOTE — PROGRESS NOTES
CHW - Initial Contact    This Community Health Worker completed the Social Determinant of Health questionnaire with patient via telephone today.    Pt identified barriers of most importance are: Additional food resources    Referrals to community agencies completed with patient/caregiver consent outside of [facility] include: yes  Referrals were put through [facility] - yes:   Support and Services: Mr. Sales stated he has previously has covid which has caused him a lot of complication post his recovery. He is currently working with someone to provide him diabetic shoes since having food pains. He was unable to go back to his original job due to having complication with his breathing and feet pos having covid. He has applied for disability and was denied. He is currently working for [employer] as there . He is not making the same amount he does pre covid and his income has been cut in half. His family currently received SNAP benefits. CHW informed him of three food barrera within his zip code. Referrals vis  was also sent for additional food resources. No additional needs at this time  Other information discussed the patient needs / wants help with: Diabetic shoes   Follow up required: yes   Follow-up Outreach - Due: 5/11/2023

## 2023-05-08 PROBLEM — Z00.00 WELLNESS EXAMINATION: Status: RESOLVED | Noted: 2023-02-06 | Resolved: 2023-05-08

## 2023-05-09 ENCOUNTER — TELEPHONE (OUTPATIENT)
Dept: ADMINISTRATIVE | Facility: HOSPITAL | Age: 43
End: 2023-05-09
Payer: MEDICAID

## 2023-05-10 ENCOUNTER — PATIENT OUTREACH (OUTPATIENT)
Dept: ADMINISTRATIVE | Facility: OTHER | Age: 43
End: 2023-05-10
Payer: MEDICAID

## 2023-05-10 NOTE — PROGRESS NOTES
CHW - Outreach Attempt    Community Health Worker left a voicemail message for 1st attempt to contact patient regarding: visit to food barrera.  Community Health Worker to attempt to contact patient on: [phone]

## 2023-05-15 NOTE — PROGRESS NOTES
CHW - Case Closure    This Community Health Worker spoke to patient via telephone today.   Pt reported: Mr. Sewell stated he has not visit the food barrera and asked for the list of food barrera to be text to him. CHW text patient four food barrera in his zip code.  Pt denied any additional needs at this time and agrees with episode closure at this time.  Provided patient with Community Health Worker's contact information and encouraged him/her to contact this Community Health Worker if additional needs arise.

## 2023-06-19 ENCOUNTER — PATIENT MESSAGE (OUTPATIENT)
Dept: ADMINISTRATIVE | Facility: HOSPITAL | Age: 43
End: 2023-06-19
Payer: MEDICAID

## 2023-08-17 ENCOUNTER — LAB VISIT (OUTPATIENT)
Dept: LAB | Facility: HOSPITAL | Age: 43
End: 2023-08-17
Attending: NURSE PRACTITIONER
Payer: MEDICAID

## 2023-08-17 DIAGNOSIS — Z12.5 SCREENING FOR PROSTATE CANCER: ICD-10-CM

## 2023-08-17 DIAGNOSIS — Z00.00 WELLNESS EXAMINATION: ICD-10-CM

## 2023-08-17 DIAGNOSIS — E11.65 TYPE 2 DIABETES MELLITUS WITH HYPERGLYCEMIA, WITHOUT LONG-TERM CURRENT USE OF INSULIN: ICD-10-CM

## 2023-08-17 LAB
ALBUMIN SERPL-MCNC: 3.8 G/DL (ref 3.5–5)
ALBUMIN/GLOB SERPL: 1 RATIO (ref 1.1–2)
ALP SERPL-CCNC: 77 UNIT/L (ref 40–150)
ALT SERPL-CCNC: 23 UNIT/L (ref 0–55)
AST SERPL-CCNC: 16 UNIT/L (ref 5–34)
BASOPHILS # BLD AUTO: 0.08 X10(3)/MCL
BASOPHILS NFR BLD AUTO: 0.8 %
BILIRUB SERPL-MCNC: 0.3 MG/DL
BUN SERPL-MCNC: 19.7 MG/DL (ref 8.9–20.6)
CALCIUM SERPL-MCNC: 9.3 MG/DL (ref 8.4–10.2)
CHLORIDE SERPL-SCNC: 102 MMOL/L (ref 98–107)
CO2 SERPL-SCNC: 29 MMOL/L (ref 22–29)
CREAT SERPL-MCNC: 0.79 MG/DL (ref 0.73–1.18)
DEPRECATED CALCIDIOL+CALCIFEROL SERPL-MC: 29.1 NG/ML (ref 30–80)
EOSINOPHIL # BLD AUTO: 0.14 X10(3)/MCL (ref 0–0.9)
EOSINOPHIL NFR BLD AUTO: 1.4 %
ERYTHROCYTE [DISTWIDTH] IN BLOOD BY AUTOMATED COUNT: 13.8 % (ref 11.5–17)
EST. AVERAGE GLUCOSE BLD GHB EST-MCNC: 145.6 MG/DL
GFR SERPLBLD CREATININE-BSD FMLA CKD-EPI: >60 MLS/MIN/1.73/M2
GLOBULIN SER-MCNC: 3.7 GM/DL (ref 2.4–3.5)
GLUCOSE SERPL-MCNC: 135 MG/DL (ref 74–100)
HBA1C MFR BLD: 6.7 %
HCT VFR BLD AUTO: 47.6 % (ref 42–52)
HGB BLD-MCNC: 15.1 G/DL (ref 14–18)
IMM GRANULOCYTES # BLD AUTO: 0.04 X10(3)/MCL (ref 0–0.04)
IMM GRANULOCYTES NFR BLD AUTO: 0.4 %
LYMPHOCYTES # BLD AUTO: 2.53 X10(3)/MCL (ref 0.6–4.6)
LYMPHOCYTES NFR BLD AUTO: 25.7 %
MCH RBC QN AUTO: 29 PG (ref 27–31)
MCHC RBC AUTO-ENTMCNC: 31.7 G/DL (ref 33–36)
MCV RBC AUTO: 91.4 FL (ref 80–94)
MONOCYTES # BLD AUTO: 1.18 X10(3)/MCL (ref 0.1–1.3)
MONOCYTES NFR BLD AUTO: 12 %
NEUTROPHILS # BLD AUTO: 5.88 X10(3)/MCL (ref 2.1–9.2)
NEUTROPHILS NFR BLD AUTO: 59.7 %
NRBC BLD AUTO-RTO: 0 %
PLATELET # BLD AUTO: 465 X10(3)/MCL (ref 130–400)
PMV BLD AUTO: 9.6 FL (ref 7.4–10.4)
POTASSIUM SERPL-SCNC: 4.5 MMOL/L (ref 3.5–5.1)
PROT SERPL-MCNC: 7.5 GM/DL (ref 6.4–8.3)
PSA SERPL-MCNC: 0.37 NG/ML
RBC # BLD AUTO: 5.21 X10(6)/MCL (ref 4.7–6.1)
SODIUM SERPL-SCNC: 140 MMOL/L (ref 136–145)
TSH SERPL-ACNC: 1.66 UIU/ML (ref 0.35–4.94)
WBC # SPEC AUTO: 9.85 X10(3)/MCL (ref 4.5–11.5)

## 2023-08-17 PROCEDURE — 83036 HEMOGLOBIN GLYCOSYLATED A1C: CPT

## 2023-08-17 PROCEDURE — 84443 ASSAY THYROID STIM HORMONE: CPT

## 2023-08-17 PROCEDURE — 82306 VITAMIN D 25 HYDROXY: CPT

## 2023-08-17 PROCEDURE — 85025 COMPLETE CBC W/AUTO DIFF WBC: CPT

## 2023-08-17 PROCEDURE — 80053 COMPREHEN METABOLIC PANEL: CPT

## 2023-08-17 PROCEDURE — 84153 ASSAY OF PSA TOTAL: CPT

## 2023-08-17 PROCEDURE — 36415 COLL VENOUS BLD VENIPUNCTURE: CPT

## 2023-08-23 ENCOUNTER — OFFICE VISIT (OUTPATIENT)
Dept: INTERNAL MEDICINE | Facility: CLINIC | Age: 43
End: 2023-08-23
Payer: MEDICAID

## 2023-08-23 DIAGNOSIS — I10 HYPERTENSION, UNSPECIFIED TYPE: ICD-10-CM

## 2023-08-23 DIAGNOSIS — E55.9 VITAMIN D DEFICIENCY: Primary | ICD-10-CM

## 2023-08-23 DIAGNOSIS — E78.5 HYPERLIPIDEMIA, UNSPECIFIED HYPERLIPIDEMIA TYPE: ICD-10-CM

## 2023-08-23 DIAGNOSIS — E11.65 TYPE 2 DIABETES MELLITUS WITH HYPERGLYCEMIA, WITHOUT LONG-TERM CURRENT USE OF INSULIN: ICD-10-CM

## 2023-08-23 DIAGNOSIS — D75.839 THROMBOCYTOSIS: ICD-10-CM

## 2023-08-23 DIAGNOSIS — Z00.00 WELLNESS EXAMINATION: ICD-10-CM

## 2023-08-23 PROCEDURE — 3066F NEPHROPATHY DOC TX: CPT | Mod: CPTII,95,, | Performed by: NURSE PRACTITIONER

## 2023-08-23 PROCEDURE — 3060F PR POS MICROALBUMINURIA RESULT DOCUMENTED/REVIEW: ICD-10-PCS | Mod: CPTII,95,, | Performed by: NURSE PRACTITIONER

## 2023-08-23 PROCEDURE — 3060F POS MICROALBUMINURIA REV: CPT | Mod: CPTII,95,, | Performed by: NURSE PRACTITIONER

## 2023-08-23 PROCEDURE — 4010F PR ACE/ARB THEARPY RXD/TAKEN: ICD-10-PCS | Mod: CPTII,95,, | Performed by: NURSE PRACTITIONER

## 2023-08-23 PROCEDURE — 99214 OFFICE O/P EST MOD 30 MIN: CPT | Mod: 25,95,, | Performed by: NURSE PRACTITIONER

## 2023-08-23 PROCEDURE — 1160F RVW MEDS BY RX/DR IN RCRD: CPT | Mod: CPTII,95,, | Performed by: NURSE PRACTITIONER

## 2023-08-23 PROCEDURE — 99214 PR OFFICE/OUTPT VISIT, EST, LEVL IV, 30-39 MIN: ICD-10-PCS | Mod: 25,95,, | Performed by: NURSE PRACTITIONER

## 2023-08-23 PROCEDURE — 3044F HG A1C LEVEL LT 7.0%: CPT | Mod: CPTII,95,, | Performed by: NURSE PRACTITIONER

## 2023-08-23 PROCEDURE — 3066F PR DOCUMENTATION OF TREATMENT FOR NEPHROPATHY: ICD-10-PCS | Mod: CPTII,95,, | Performed by: NURSE PRACTITIONER

## 2023-08-23 PROCEDURE — 1160F PR REVIEW ALL MEDS BY PRESCRIBER/CLIN PHARMACIST DOCUMENTED: ICD-10-PCS | Mod: CPTII,95,, | Performed by: NURSE PRACTITIONER

## 2023-08-23 PROCEDURE — 1159F PR MEDICATION LIST DOCUMENTED IN MEDICAL RECORD: ICD-10-PCS | Mod: CPTII,95,, | Performed by: NURSE PRACTITIONER

## 2023-08-23 PROCEDURE — 1159F MED LIST DOCD IN RCRD: CPT | Mod: CPTII,95,, | Performed by: NURSE PRACTITIONER

## 2023-08-23 PROCEDURE — 3044F PR MOST RECENT HEMOGLOBIN A1C LEVEL <7.0%: ICD-10-PCS | Mod: CPTII,95,, | Performed by: NURSE PRACTITIONER

## 2023-08-23 PROCEDURE — 4010F ACE/ARB THERAPY RXD/TAKEN: CPT | Mod: CPTII,95,, | Performed by: NURSE PRACTITIONER

## 2023-08-23 RX ORDER — ERGOCALCIFEROL 1.25 MG/1
50000 CAPSULE ORAL
Qty: 4 CAPSULE | Refills: 2 | Status: SHIPPED | OUTPATIENT
Start: 2023-08-23 | End: 2023-11-21

## 2023-08-23 RX ORDER — ATORVASTATIN CALCIUM 10 MG/1
10 TABLET, FILM COATED ORAL NIGHTLY
Qty: 90 TABLET | Refills: 1 | Status: SHIPPED | OUTPATIENT
Start: 2023-08-23 | End: 2023-11-20 | Stop reason: SDUPTHER

## 2023-08-23 RX ORDER — SEMAGLUTIDE 1.34 MG/ML
1 INJECTION, SOLUTION SUBCUTANEOUS
Qty: 3 ML | Refills: 5 | Status: SHIPPED | OUTPATIENT
Start: 2023-08-23 | End: 2023-11-27

## 2023-08-23 RX ORDER — LISINOPRIL 20 MG/1
20 TABLET ORAL DAILY
Qty: 90 TABLET | Refills: 1 | Status: SHIPPED | OUTPATIENT
Start: 2023-08-23 | End: 2023-11-20 | Stop reason: SDUPTHER

## 2023-08-23 NOTE — PROGRESS NOTES
"HERACLIO Tolliver   [facility]  [address] [facility] [address]      PATIENT NAME: Dimas Sales  : 1980  DATE: 23  MRN: [id number]        Reason for Visit / Chief Complaint: Diabetes and Follow-up       History of Present Illness / Problem Focused Workflow     Dimas Sales presents to the clinic with Diabetes and Follow-up     Initial Visit (2018): 38 y.o.  male presenting to the clinic to re-establish primary care. Previous PCP ANALIA [place] NP. Last OV 2018. PMHx significant for HTN, T2DM, and HLD. Bp at goal. Currently taking Lisinopril 2.5 mg po daily. HgA1c 6.4% (2018). Currently taking Metformin  mg po daily. Denies s/s of hyper/hypoglycemia or neuropathy. Pt admits that recent eating habits have been poor. LDL 66. Currently taking Atorvastatin 10 mg po daily. Tolerating well. Working on losing weight. Took Adipex in the past with good results, however, he gained all weight back plus more after stopping drug. Wants to try to lose weight naturally. Denies fever, chills, HA, CP, SOB, or any other concerns today.     (2019): 38 y.o.  male presenting to the clinic for routine f/u. PMHx significant for HTN, T2DM, and HLD. Bp at goal. Currently taking Lisinopril 2.5 mg po daily. HgA1c 6.5%. Currently taking Metformin  mg po daily. Denies s/s of hyper/hypoglycemia. Admits occasional "shooting/burning" pain in BLE and feet. LDL 77. Currently taking Atorvastatin 10 mg po daily. Tolerating well. Pt still actively trying to lose weight. C/o right knee pain. Reports that he was at work last week and went to move a piece of iron by pushing it with the inner aspect of right foot, now, pt states that he believes he strained his right knee. C/o moderate, aching right knee pain. Denies erythema or joint swelling. Pain exacerbated with bending and extending right knee. Relieved with " "rest. Occasionally associated with "burning" sensation down right leg. Has not tried any remedies for pain. Denies fever, chills, HA, CP, SOB, or any other concerns today.     (6/24/19): 38 y.o.  male presenting to the clinic for routine f/u HTN, T2DM, and HLD. Now following [facility] for right knee pain. HgA1c 6.4%. Currently taking Metformin  mg po daily. Denies s/s of hyper/hypoglycemia. LDL 95. Currently taking Atorvastatin 10 mg po daily. Requesting medication refill. Today, pt c/o right shoulder pain. Reports hx of fall that sparked right shoulder pain. Pain would only occur on occasion are was largely controlled following the fall. However, pt reports an increase in pain over the last several weeks. He works in construction and admits heavy lifting, operating heavy machinery, and climbing. Pain reported as a throbbing pain that is moderate to severe in intensity. Often travels down right arm. Associated with numbness and tingling to distal right arm (also occurs in distal left arm). Exacerbated by strenuous activity. Relieved with NSAIDs and Gabapentin as needed. States, "I might need an injection in my shoulder." Denies CP or SOB. No other problems stated.     (6/12/2020): 39 y.o.  male with a PmHx of HTN, T2DM, HLD, and morbid obesity presenting for routine f/u. He's also following [facility] for right knee pain. Bp at goal. HgA1c 6.5%. Currently taking Metformin  mg po daily. Tolerating well. Denies s/s of hyper/hypoglycemia. . Currently prescribed Atorvastatin 10 mg po daily. He admits that he's not been 100% compliant with his medications. Pt c/o left foot/heel pain. He works in construction. He wears steel toe boots on a daily basis and stands on feet on concrete for the majority of the day. He climbs ladders often as well. He states that sometimes it hurts to even put his feet on the ground to begin walking in the morning. He describes the pain as aching and burning. He's been " prescribed Gabapentin in the past, which he reports helps with the pain, but he cannot take it all of the time because it causes him to be too drowsiness when he wakes for work in the am. He reports that he's tried interventions with a water bottle and tennis ball to relieve the pain to bottom of feet with minimal relief. He's also c/o a lesion to bridge of nose x many years, recently grown in size. States he had an aunt who had a similar lesion that tested + for skin cancer in the past. Works in the sun on most days. Rarely wears sun screen. Lastly, he's c/o darkening discoloration to distal lower ext. H/o cellulitis in the past in same region. Rarely swells. No calf pain or redness at this time. No other problems stated.     (12/18/2020): 40 y.o.  male with a PmHx of HTN, T2DM, HLD, plantar fasciitis, h/o COVID-19, and morbid obesity presenting for routine f/u. He's also following [facility] for right knee pain. Seen in ENT 12/16/2020. Previously referred from [facility] for BCC of nose (confirmed by bx). He had lesion excised 12/2 in ENT. States had stitches but one of them popped one day while lying down and rubbing on pillow. He presented back to ENT (not medicine clinic) requesting to have stitches removed/wound evaluated but he was told to go to ED for such. At present, lesion is scabbed.  He is to keep clean and apply Bacitracin ointment as prescribed. He apparently declined a referral for reconstructive sx. Will f/u in ENT in 2 weeks. Bp at goal. HgA1c 7.3%. Currently prescribed Metformin  mg po daily.  (goal <100). Admits not taking regularly, then, when he started taking ~1 week ago, he was taking 20 mg total of Lipitor as he had two bottles of the same medication. Pt admits that since having COVID-19 in July, he's not been taking his medications regularly. Admits that the virus took a toll on his physical and mental health. He felt depressed being isolated from family, including  , at the time. He subsequently lost his job (this had a major impact on depressed mood). Thus, he was not following his routine medical regimen or diet as a way to cope. At present, he denies SI/HI. He is finding some work off and on, and is making an effort to take medications as prescribed. He is amenable to receiving the influenza vaccine. CBC and LFTs were abnl during hospitalization with COVID-19 in July. Has not been re-assessed since. No other problems stated.     (2021): 40 y.o.  male with a PmHx of HTN, T2DM, HLD, plantar fasciitis, h/o COVID-19, and morbid obesity presenting for routine f/u. Bp at goal. HgA1c 8.1%. Currently prescribed Metformin  mg po daily. Admits stopped taking medication for a while. LDL 78 (goal <100). Prescribed Atorvastatin 10 mg po daily. Pt admits that since having COVID-19 in July, he's not been taking his medications regularly. Admits that the virus took a toll on his physical and mental health. He feels depressed due to losing his job (this had a major impact on depressed mood). Feels he can't provide for his family. Saved money is running out. Also c/o mx joint pains; specifically bilateral shoulders x years. States shoulders ache. Has trouble lifting things above his head. 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 Got denied for disability. Has re-applied; waiting for an answer. Gaining weight. Doesn't feel motivated to do much or exercise. At present, he denies SI/HI. Lastly, c/o cough productive of yellow mucous, nasal congestion, runny nose, itchy ears, crusty eyes x 2 weeks. States older daughter had symptoms first. Then a younger dtr and wife. His symptoms are lingering. No fever or chills at present but felt feverish at start of illness. No other problems stated.     (2021): 40 y.o.  male with a PmHx of HTN, T2DM, HLD, plantar fasciitis, h/o COVID-19, and morbid obesity  "presenting for routine f/u. Bp at goal. HgA1c 9.2%, increased from previous. Currently prescribed Metformin  mg po BID. Admits stopped taking medication for a while at last visit. Now, he admits skipping doses. He is not following an ADA diet. Has been eating "pizza and cakes." He is following Ortho for bilateral shoulder pain. Currently undergoing P.T. Bp elevated today. Taking Lisinopril 2.5 mg po daily. Originally started for renal protection given h/o DM. Denies HA, dizziness, weakness, numbness/tingling, CP, SOB, or any acute concerns. No other concerns.     (12/21/2021): 41 y.o.  male with a PmHx of HTN, T2DM, HLD, plantar fasciitis, h/o COVID-19, and morbid obesity presenting for routine f/u. Bp at goal. HgA1c 7.7%, improved from previous. FBG also significantly improved. Pt reports that he's making a conscious effort to make better food choices such as choosing sugar-free drinks over regular sodas. Also "cutting back" on intake overall. Currently prescribed Metformin ER 1,000 mg po BID. Tolerating medication better. Bp at goal. Declines vaccines. Following Ortho clinic for right shoulder pain. No other concerns.      Telemedicine Visit (3/22/2022): 41 y.o.  male with a PmHx of HTN, T2DM, HLD, plantar fasciitis, h/o COVID-19, and morbid obesity presenting for routine DM f/u. HgA1c 7.1%, improved from previous (9.2%, 7.7%). FBG also consistently improving from September assessment; now 140s. 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 He's been eating healthier on his new job. States eating salads. Reports losing ~20+ lbs from last visit. Total platelet count (initially elevated during COVID infection 7/2020), improving. Patient was previously instructed to start ASA 81 mg po daily, but admits forgetting to obtain. He is c/o intermittent, shocking right ankle pain and swelling. He has not tried any medications to relieve pain. Pain is worse with prolonged ambulation. Relieved with " "rest. States played sports in high school and can recall spraining ankle many times. No other concerns.     TV (7/22/2022): 41 y.o.  male with a PmHx of HTN, T2DM, HLD, plantar fasciitis, h/o COVID-19, elevated platelets (h/o splenectomy), and morbid obesity presenting for routine DM f/u. HgA1c 7.6%, worsened from previous. Prescribed Metformin 1,000 mg po BID but admits not taking as prescribed. Also reports "off of diet." One of his children had a birthday earlier this month. Admits had been eating a lot of sweets around that time. BP slightly elevated today. Asymptomatic. Prescribed Lisinopril 20 mg po daily but not taking as prescribed. Platelets improved to 404k, glucose 161,  (not taking statin), and microalb slightly elevated. He is due for eye and foot exam today. C/o intermittent throbbing, burning right foot/heel pain. 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 This exacerbates his pain. Known history of plantar fasciitis. States had CSI in the past with some relief. Uses topical Voltaren with mild relief.     TV (2/6/2023): 42 y.o.  male with a PmHx of HTN, T2DM, HLD, plantar fasciitis, h/o COVID-19, elevated platelets (h/o splenectomy), and morbid obesity presenting for routine DM f/u. At last visit, HgA1c was 7.6%, worsened from previous. Today, A1c is 12.1%. He was prescribed Metformin 1,000 mg po BID but admitted not taking as prescribed. Also reported "off of diet." Previously prescribed the ER form of Metformin due to past issues with diarrhea. He'd also declined additional medications at that time. Today, he's accompanied by his wife, Precious, who verbalizes that patient has not taken Metformin in close to one year. Patient then confirmed this, noting he really hadn't taken the Metformin because he started a new job that [place] [employer] [place]. States not able to make several stops to use the restroom so he stopped " medication altogether. He also stopped his Atorvastatin and Lisinopril. BP slightly elevated today. Asymptomatic.      Wife mentions that patient's sister was diagnosed with colon cancer in her late 50s. States patient has not previously reported this. Patient is denying any GI issues and is not interested in a screening colonoscopy at this time.        Diabetes  He presents for his follow-up diabetic visit. He has type 2 diabetes mellitus. His disease course has been worsening. There are no hypoglycemic associated symptoms. Associated symptoms include polydipsia and polyuria. There are no hypoglycemic complications. There are no diabetic complications. Risk factors for coronary artery disease include diabetes mellitus, dyslipidemia, hypertension, male sex, obesity, sedentary lifestyle and stress. Current diabetic treatment includes oral agent (monotherapy). He is compliant with treatment none of the time. His weight is fluctuating minimally. He is following a generally unhealthy diet. When asked about meal planning, he reported none. He has not had a previous visit with a dietitian. He rarely participates in exercise. An ACE inhibitor/angiotensin II receptor blocker is not being taken. He does not see a podiatrist.Eye exam is not current.     5/3/23: Dimas is presenting for DM f/u. He was started on Ozempic at last visit. Currently taking 0.5 mg SQ weekly. Needs refill. He has lost 14 lbs since last visit. HgA1c 8.9%; previously 12.1%. [facility] average home CBGs around 120s. LDL 70s, decreased from one-teens in July 2022. He admits that he is not eating as healthy as he was a few months ago, but is trying to get back on track. Otherwise, he is pleased with the Ozempic. He denies any GI complaints or throat complaints.     He does briefly mention recurrent right shoulder pain that was evaluated in the past with XR and Ortho consult and for which he attended P.T. for. Believes his new occupation, which involves  driving a bus, is contributing to the recurrent discomfort. But admits not using the Mobic as prescribed and not sure he has time to pursue P.T. again at this time. He does have FROM of [facility].    He also reports continued aching pain to feet. Has been diagnosed with plantar fasciitis in the past. States feels similar. He is stretching with some relief. Interested in diabetic shoes.    8/23/23: Dimas is presenting for DM f/u. He was started on Ozempic at a previous visit, then it was titrated up in May. Currently taking 1 mg SQ weekly. He has lost 17 lbs since February. HgA1c 6.7%; previously 8.9%. Admits has not been assessing CBGs at home. FBG 130s on recent labs. He admits that he is not eating as healthy as he was a few months ago, but is trying to get back on track. Admits occasional constipation which he attributes to Ozempic, however, he has eaten prunes with relief. Otherwise, he is pleased with the Ozempic. He denies any other concerns, lump in throat, trouble swallowing, or SOB/CP.    No other concerns.     Diabetes  He presents for his follow-up diabetic visit. He has type 2 diabetes mellitus. His disease course has been improving. Pertinent negatives for hypoglycemia include no confusion, dizziness, headaches, seizures, speech difficulty or tremors. Pertinent negatives for diabetes include no chest pain, no polydipsia, no polyuria and no weakness. Risk factors for coronary artery disease include diabetes mellitus, dyslipidemia, male sex, obesity and hypertension. Current diabetic treatments: GLP-1.       Review of Systems     Review of Systems   Constitutional: Negative.  Negative for activity change and unexpected weight change.   HENT: Negative.  Negative for hearing loss, rhinorrhea and trouble swallowing.    Eyes: Negative.  Negative for discharge and visual disturbance.   Respiratory: Negative.  Negative for cough, choking, chest tightness, shortness of breath and wheezing.    Cardiovascular:  "Negative.  Negative for chest pain, palpitations and leg swelling.   Gastrointestinal: Negative.  Negative for blood in stool, constipation and diarrhea.   Endocrine: Negative.  Negative for polydipsia and polyuria.   Genitourinary: Negative.  Negative for difficulty urinating, hematuria and urgency.   Skin: Negative.    Allergic/Immunologic: Negative.    Neurological: Negative.  Negative for dizziness, tremors, seizures, syncope, facial asymmetry, speech difficulty, weakness, light-headedness, numbness and headaches.   Hematological: Negative.    Psychiatric/Behavioral: Negative.  Negative for confusion and dysphoric mood.        Medical / Social / Family History   History reviewed. No pertinent past medical history.    Past Surgical History:   Procedure Laterality Date    SPLENECTOMY, TOTAL         Social History    reports that he has never smoked. He has never been exposed to tobacco smoke. He has never used smokeless tobacco. He reports current alcohol use of about 2.0 standard drinks of alcohol per week. He reports that he does not use drugs.    Family History  's family history includes Cancer in his sister; Colon cancer in his sister; Diabetes in his father and mother; Hypertension in his father.    Medications and Allergies     Medications  Current Outpatient Medications   Medication Instructions    aspirin (ECOTRIN) 81 mg, Oral, Daily    atorvastatin (LIPITOR) 10 mg, Oral, Nightly    BD ULTRA-FINE MICRO PEN NEEDLE 32 gauge x 1/4" Ndle Subcutaneous    blood sugar diagnostic Strp Use daily to check blood sugar    ergocalciferol (ERGOCALCIFEROL) 50,000 Units, Oral, Every 7 days    lisinopriL (PRINIVIL,ZESTRIL) 20 mg, Oral, Daily    loratadine (CLARITIN) 10 mg, Oral, Daily    meloxicam (MOBIC) 15 mg, Oral, Daily PRN    methocarbamoL (ROBAXIN) 1,500 mg, Oral, Every 8 hours PRN    ONETOUCH DELICA PLUS LANCET 30 gauge Misc USE 1 LANCET DAILY    ONETOUCH ULTRA2 METER Misc SMARTSIG:Via Meter Daily    OZEMPIC " 1 mg, Subcutaneous, Every 7 days       Allergies  Review of patient's allergies indicates:  No Known Allergies    Physical Examination   There were no vitals taken for this visit.    Physical Exam  Constitutional:       Appearance: Normal appearance. He is obese.   HENT:      Head: Normocephalic and atraumatic.      Right Ear: External ear normal.      Left Ear: External ear normal.   Pulmonary:      Effort: Pulmonary effort is normal.   Musculoskeletal:         General: Normal range of motion.   Neurological:      Mental Status: He is alert and oriented to person, place, and time.   Psychiatric:         Mood and Affect: Mood normal.         Behavior: Behavior normal.         Thought Content: Thought content normal.         Judgment: Judgment normal.           Results     Chemistry:  Lab Results   Component Value Date     08/17/2023    K 4.5 08/17/2023    CHLORIDE 102 08/17/2023    BUN 19.7 08/17/2023    CREATININE 0.79 08/17/2023    EGFRNORACEVR >60 08/17/2023    GLUCOSE 135 (H) 08/17/2023    CALCIUM 9.3 08/17/2023    ALKPHOS 77 08/17/2023    LABPROT 7.5 08/17/2023    ALBUMIN 3.8 08/17/2023    BILIDIR 0.3 03/16/2022    IBILI 0.30 03/16/2022    AST 16 08/17/2023    ALT 23 08/17/2023    MG 2.6 07/16/2020    PHOS 3.9 07/16/2020    YJAHSBOB20IN 29.1 (L) 08/17/2023        Lab Results   Component Value Date    HGBA1C 6.7 08/17/2023        Hematology:  Lab Results   Component Value Date    WBC 9.85 08/17/2023    HGB 15.1 08/17/2023    HCT 47.6 08/17/2023     (H) 08/17/2023       Lipid Panel:  Lab Results   Component Value Date    CHOL 128 05/03/2023    HDL 38 05/03/2023    LDL 73.00 05/03/2023    TRIG 87 05/03/2023    TOTALCHOLEST 3 05/03/2023        Urine:  Lab Results   Component Value Date    COLORUA Yellow 07/22/2022    APPEARANCEUA Clear 07/22/2022    SGUA 1.033 07/22/2022    PHUA 6.0 07/22/2022    PROTEINUA Trace (A) 07/22/2022    GLUCOSEUA Normal 07/22/2022    KETONESUA Negative 07/22/2022    BLOODUA  Negative 07/22/2022    NITRITESUA Negative 07/22/2022    LEUKOCYTESUR Negative 07/22/2022    RBCUA 0-5 07/22/2022    WBCUA 0-5 07/22/2022    BACTERIA None Seen 07/22/2022    SQEPUA 2-20 06/11/2021    HYALINECASTS 0-2 (A) 07/22/2022    CREATRANDUR 201.8 (H) 05/03/2023          Assessment        ICD-10-CM ICD-9-CM   1. Vitamin D deficiency  E55.9 268.9   2. Wellness examination  Z00.00 V70.0   3. Thrombocytosis  D75.839 238.71   4. Type 2 diabetes mellitus with hyperglycemia, without long-term current use of insulin  E11.65 250.00     790.29   5. Hyperlipidemia, unspecified hyperlipidemia type  E78.5 272.4   6. Hypertension, unspecified type  I10 401.9        Plan (including Health Maintenance)     Problem List Items Addressed This Visit          Cardiac/Vascular    Hyperlipidemia    Overview     Atorvastatin 10 mg po daily         Relevant Medications    atorvastatin (LIPITOR) 10 MG tablet    Other Relevant Orders    Lipid Panel    Hypertension    Overview     Lisinopril 20 mg po daily         Relevant Medications    lisinopriL (PRINIVIL,ZESTRIL) 20 MG tablet    Other Relevant Orders    Hemoglobin A1C    Comprehensive Metabolic Panel    Microalbumin/Creatinine Ratio, Urine    Lipid Panel       Oncology    Thrombocytosis    Current Assessment & Plan     Likely 2/2 splenectomy. Overall stable            Endocrine    Type 2 diabetes mellitus with hyperglycemia    Overview     A1c level: 6.7% (8/2023), 8.9% (5/3/23), 12.1% (2/6/23); 7.6% (7/2022), goal < 7%  CBG trends: none provided  Educated on diabetic diet: Low-fat, Low-carb, Low-cholesterol. Avoid sugary/dark drinks/sodas and white foods (i.e., bread, pasta, potatoes, rice)  Aerobic exercise: 20-30 min/day x 5 days/week  Diabetic Eye Exam: 7/22/22  Diabetic Foot Exam: 7/22/22  Microalbumin-U: elevated (will monitor)  Kidney Protection: Lisinopril  Statin Therapy: Atorvastatin  Educated on Hypoglycemic s/s and interventions. Call office with any questions or  concerns  Strict glucose monitoring. Bring blood glucose logs/meter to each OV           Current Assessment & Plan     A1c at goal  Wants to continue current regimen of Ozempic 1 mg SQ weekly  Start checking CBGs and bring to next visit  RTC 3 months with labs and for foot/eye exam         Relevant Medications    semaglutide (OZEMPIC) 1 mg/dose (4 mg/3 mL)    Vitamin D deficiency - Primary    Current Assessment & Plan     Vitamin D is low. I am sending a prescription of Vitamin D (Ergocalciferol) to the pharmacy). Pt is to take as prescribed. Once the prescription is completed, pt should obtain Vitamin D3 1,000 - 2,000 IU once daily.           Relevant Medications    ergocalciferol (ERGOCALCIFEROL) 50,000 unit Cap       Other    Wellness examination    Overview     Prostate Cancer Screening: PSA 0.37 (8/2023)              Health Maintenance Due   Topic Date Due    Eye Exam  Never done    Low Dose Statin  Never done    COVID-19 Vaccine (3 - Booster for Noah series) 01/24/2022    Foot Exam  07/22/2023     The patient location is: at work in break room  The chief complaint leading to consultation is: DM f/u    Visit type: audiovisual    Face to Face time with patient: 10 minutes  15 minutes of total time spent on the encounter, which includes face to face time and non-face to face time preparing to see the patient (eg, review of tests), Obtaining and/or reviewing separately obtained history, Documenting clinical information in the electronic or other health record, Independently interpreting results (not separately reported) and communicating results to the patient/family/caregiver, or Care coordination (not separately reported).     Each patient to whom he or she provides medical services by telemedicine is:  (1) informed of the relationship between the physician and patient and the respective role of any other health care provider with respect to management of the patient; and (2) notified that he or she may  decline to receive medical services by telemedicine and may withdraw from such care at any time.    For any new or worsening symptoms that are urgent, or for any s/s of MI, CVA, or any other emergent concerns, please visit the ER for further eval. Otherwise, call clinic with questions or concerns.        No future appointments.       Follow up in about 3 months (around 11/23/2023).    Signature:  HERACLIO Tolliver  [facility]  [address] [facility] [address] [facility] [address]    Date of encounter: 8/23/23

## 2023-08-23 NOTE — ASSESSMENT & PLAN NOTE
A1c at goal  Wants to continue current regimen of Ozempic 1 mg SQ weekly  Start checking CBGs and bring to next visit  RTC 3 months with labs and for foot/eye exam

## 2023-09-01 ENCOUNTER — PATIENT MESSAGE (OUTPATIENT)
Dept: ADMINISTRATIVE | Facility: HOSPITAL | Age: 43
End: 2023-09-01
Payer: MEDICAID

## 2023-11-16 ENCOUNTER — LAB VISIT (OUTPATIENT)
Dept: LAB | Facility: HOSPITAL | Age: 43
End: 2023-11-16
Attending: NURSE PRACTITIONER
Payer: MEDICAID

## 2023-11-16 DIAGNOSIS — I10 HYPERTENSION, UNSPECIFIED TYPE: ICD-10-CM

## 2023-11-16 DIAGNOSIS — E78.5 HYPERLIPIDEMIA, UNSPECIFIED HYPERLIPIDEMIA TYPE: ICD-10-CM

## 2023-11-16 DIAGNOSIS — E11.9 TYPE 2 DIABETES MELLITUS WITHOUT COMPLICATION, WITHOUT LONG-TERM CURRENT USE OF INSULIN: ICD-10-CM

## 2023-11-16 LAB
ALBUMIN SERPL-MCNC: 4.1 G/DL (ref 3.5–5)
ALBUMIN/GLOB SERPL: 1 RATIO (ref 1.1–2)
ALP SERPL-CCNC: 81 UNIT/L (ref 40–150)
ALT SERPL-CCNC: 25 UNIT/L (ref 0–55)
AST SERPL-CCNC: 16 UNIT/L (ref 5–34)
BILIRUB SERPL-MCNC: 0.6 MG/DL
BUN SERPL-MCNC: 15 MG/DL (ref 8.9–20.6)
CALCIUM SERPL-MCNC: 9.7 MG/DL (ref 8.4–10.2)
CHLORIDE SERPL-SCNC: 101 MMOL/L (ref 98–107)
CHOLEST SERPL-MCNC: 131 MG/DL
CHOLEST/HDLC SERPL: 3 {RATIO} (ref 0–5)
CO2 SERPL-SCNC: 30 MMOL/L (ref 22–29)
CREAT SERPL-MCNC: 0.83 MG/DL (ref 0.73–1.18)
CREAT UR-MCNC: 161.8 MG/DL (ref 63–166)
EST. AVERAGE GLUCOSE BLD GHB EST-MCNC: 151.3 MG/DL
GFR SERPLBLD CREATININE-BSD FMLA CKD-EPI: >60 MLS/MIN/1.73/M2
GLOBULIN SER-MCNC: 4 GM/DL (ref 2.4–3.5)
GLUCOSE SERPL-MCNC: 128 MG/DL (ref 74–100)
HBA1C MFR BLD: 6.9 %
HDLC SERPL-MCNC: 38 MG/DL (ref 35–60)
LDLC SERPL CALC-MCNC: 76 MG/DL (ref 50–140)
MICROALBUMIN UR-MCNC: 25.4 UG/ML
MICROALBUMIN/CREAT RATIO PNL UR: 15.7 MG/GM CR (ref 0–30)
POTASSIUM SERPL-SCNC: 4.5 MMOL/L (ref 3.5–5.1)
PROT SERPL-MCNC: 8.1 GM/DL (ref 6.4–8.3)
SODIUM SERPL-SCNC: 140 MMOL/L (ref 136–145)
TRIGL SERPL-MCNC: 83 MG/DL (ref 34–140)
VLDLC SERPL CALC-MCNC: 17 MG/DL

## 2023-11-16 PROCEDURE — 80061 LIPID PANEL: CPT

## 2023-11-16 PROCEDURE — 82043 UR ALBUMIN QUANTITATIVE: CPT

## 2023-11-16 PROCEDURE — 80053 COMPREHEN METABOLIC PANEL: CPT

## 2023-11-16 PROCEDURE — 36415 COLL VENOUS BLD VENIPUNCTURE: CPT

## 2023-11-16 PROCEDURE — 83036 HEMOGLOBIN GLYCOSYLATED A1C: CPT

## 2023-11-20 DIAGNOSIS — E78.5 HYPERLIPIDEMIA, UNSPECIFIED HYPERLIPIDEMIA TYPE: ICD-10-CM

## 2023-11-20 DIAGNOSIS — I10 HYPERTENSION, UNSPECIFIED TYPE: ICD-10-CM

## 2023-11-20 RX ORDER — ATORVASTATIN CALCIUM 10 MG/1
10 TABLET, FILM COATED ORAL NIGHTLY
Qty: 90 TABLET | Refills: 1 | Status: SHIPPED | OUTPATIENT
Start: 2023-11-20

## 2023-11-20 RX ORDER — LISINOPRIL 20 MG/1
20 TABLET ORAL
Qty: 90 TABLET | Refills: 1 | Status: SHIPPED | OUTPATIENT
Start: 2023-11-20

## 2023-11-27 ENCOUNTER — CLINICAL SUPPORT (OUTPATIENT)
Dept: INTERNAL MEDICINE | Facility: CLINIC | Age: 43
End: 2023-11-27
Attending: NURSE PRACTITIONER
Payer: MEDICAID

## 2023-11-27 ENCOUNTER — OFFICE VISIT (OUTPATIENT)
Dept: INTERNAL MEDICINE | Facility: CLINIC | Age: 43
End: 2023-11-27
Payer: MEDICAID

## 2023-11-27 VITALS
HEIGHT: 66 IN | RESPIRATION RATE: 20 BRPM | DIASTOLIC BLOOD PRESSURE: 69 MMHG | HEART RATE: 87 BPM | WEIGHT: 315 LBS | SYSTOLIC BLOOD PRESSURE: 102 MMHG | BODY MASS INDEX: 50.62 KG/M2 | TEMPERATURE: 98 F

## 2023-11-27 DIAGNOSIS — Z13.5 SCREENING FOR DIABETIC RETINOPATHY: ICD-10-CM

## 2023-11-27 DIAGNOSIS — Z80.0 FAMILY HISTORY OF COLON CANCER: ICD-10-CM

## 2023-11-27 DIAGNOSIS — L84 CALLUS OF TOE: ICD-10-CM

## 2023-11-27 DIAGNOSIS — M54.50 ACUTE BILATERAL LOW BACK PAIN WITHOUT SCIATICA: ICD-10-CM

## 2023-11-27 DIAGNOSIS — E11.65 TYPE 2 DIABETES MELLITUS WITH HYPERGLYCEMIA, WITHOUT LONG-TERM CURRENT USE OF INSULIN: Primary | ICD-10-CM

## 2023-11-27 DIAGNOSIS — L60.2 OVERGROWN TOENAILS: ICD-10-CM

## 2023-11-27 DIAGNOSIS — R23.8 DUSKY FEET: ICD-10-CM

## 2023-11-27 PROBLEM — Z00.00 WELLNESS EXAMINATION: Status: RESOLVED | Noted: 2023-02-06 | Resolved: 2023-11-27

## 2023-11-27 LAB
LEFT EYE DM RETINOPATHY: NEGATIVE
RIGHT EYE DM RETINOPATHY: NEGATIVE

## 2023-11-27 PROCEDURE — 92228 IMG RTA DETC/MNTR DS PHY/QHP: CPT | Mod: PBBFAC

## 2023-11-27 PROCEDURE — 3074F SYST BP LT 130 MM HG: CPT | Mod: CPTII,,, | Performed by: NURSE PRACTITIONER

## 2023-11-27 PROCEDURE — 3074F PR MOST RECENT SYSTOLIC BLOOD PRESSURE < 130 MM HG: ICD-10-PCS | Mod: CPTII,,, | Performed by: NURSE PRACTITIONER

## 2023-11-27 PROCEDURE — 4010F ACE/ARB THERAPY RXD/TAKEN: CPT | Mod: CPTII,,, | Performed by: NURSE PRACTITIONER

## 2023-11-27 PROCEDURE — 1159F MED LIST DOCD IN RCRD: CPT | Mod: CPTII,,, | Performed by: NURSE PRACTITIONER

## 2023-11-27 PROCEDURE — 1160F PR REVIEW ALL MEDS BY PRESCRIBER/CLIN PHARMACIST DOCUMENTED: ICD-10-PCS | Mod: CPTII,,, | Performed by: NURSE PRACTITIONER

## 2023-11-27 PROCEDURE — 99214 OFFICE O/P EST MOD 30 MIN: CPT | Mod: S$PBB,,, | Performed by: NURSE PRACTITIONER

## 2023-11-27 PROCEDURE — 3078F DIAST BP <80 MM HG: CPT | Mod: CPTII,,, | Performed by: NURSE PRACTITIONER

## 2023-11-27 PROCEDURE — 3066F PR DOCUMENTATION OF TREATMENT FOR NEPHROPATHY: ICD-10-PCS | Mod: CPTII,,, | Performed by: NURSE PRACTITIONER

## 2023-11-27 PROCEDURE — 99214 PR OFFICE/OUTPT VISIT, EST, LEVL IV, 30-39 MIN: ICD-10-PCS | Mod: S$PBB,,, | Performed by: NURSE PRACTITIONER

## 2023-11-27 PROCEDURE — 3044F PR MOST RECENT HEMOGLOBIN A1C LEVEL <7.0%: ICD-10-PCS | Mod: CPTII,,, | Performed by: NURSE PRACTITIONER

## 2023-11-27 PROCEDURE — 3008F BODY MASS INDEX DOCD: CPT | Mod: CPTII,,, | Performed by: NURSE PRACTITIONER

## 2023-11-27 PROCEDURE — 99215 OFFICE O/P EST HI 40 MIN: CPT | Mod: PBBFAC | Performed by: NURSE PRACTITIONER

## 2023-11-27 PROCEDURE — 3061F PR NEG MICROALBUMINURIA RESULT DOCUMENTED/REVIEW: ICD-10-PCS | Mod: CPTII,,, | Performed by: NURSE PRACTITIONER

## 2023-11-27 PROCEDURE — 1160F RVW MEDS BY RX/DR IN RCRD: CPT | Mod: CPTII,,, | Performed by: NURSE PRACTITIONER

## 2023-11-27 PROCEDURE — 1159F PR MEDICATION LIST DOCUMENTED IN MEDICAL RECORD: ICD-10-PCS | Mod: CPTII,,, | Performed by: NURSE PRACTITIONER

## 2023-11-27 PROCEDURE — 4010F PR ACE/ARB THEARPY RXD/TAKEN: ICD-10-PCS | Mod: CPTII,,, | Performed by: NURSE PRACTITIONER

## 2023-11-27 PROCEDURE — 3044F HG A1C LEVEL LT 7.0%: CPT | Mod: CPTII,,, | Performed by: NURSE PRACTITIONER

## 2023-11-27 PROCEDURE — 3061F NEG MICROALBUMINURIA REV: CPT | Mod: CPTII,,, | Performed by: NURSE PRACTITIONER

## 2023-11-27 PROCEDURE — 3008F PR BODY MASS INDEX (BMI) DOCUMENTED: ICD-10-PCS | Mod: CPTII,,, | Performed by: NURSE PRACTITIONER

## 2023-11-27 PROCEDURE — 3066F NEPHROPATHY DOC TX: CPT | Mod: CPTII,,, | Performed by: NURSE PRACTITIONER

## 2023-11-27 PROCEDURE — 3078F PR MOST RECENT DIASTOLIC BLOOD PRESSURE < 80 MM HG: ICD-10-PCS | Mod: CPTII,,, | Performed by: NURSE PRACTITIONER

## 2023-11-27 RX ORDER — PREDNISONE 20 MG/1
20 TABLET ORAL
COMMUNITY
Start: 2023-11-25 | End: 2023-11-27 | Stop reason: HOSPADM

## 2023-11-27 RX ORDER — SEMAGLUTIDE 2.68 MG/ML
2 INJECTION, SOLUTION SUBCUTANEOUS
Qty: 3 ML | Refills: 5 | Status: SHIPPED | OUTPATIENT
Start: 2023-11-27 | End: 2024-05-25

## 2023-11-27 RX ORDER — CYCLOBENZAPRINE HCL 10 MG
10 TABLET ORAL
COMMUNITY
Start: 2023-11-25 | End: 2023-11-27 | Stop reason: HOSPADM

## 2023-11-27 RX ORDER — KETOROLAC TROMETHAMINE 10 MG/1
10 TABLET, FILM COATED ORAL EVERY 6 HOURS PRN
Qty: 20 TABLET | Refills: 0 | Status: SHIPPED | OUTPATIENT
Start: 2023-11-27 | End: 2023-12-02

## 2023-11-27 RX ORDER — DICLOFENAC SODIUM 75 MG/1
75 TABLET, DELAYED RELEASE ORAL 2 TIMES DAILY
COMMUNITY
Start: 2023-11-25 | End: 2023-11-27 | Stop reason: HOSPADM

## 2023-11-27 NOTE — ASSESSMENT & PLAN NOTE
A1c at goal but rising as compared to previous. Will increase Ozempic to 2 mg SQ weekly.  Continue other medications as prescribed  LDL at goal of < 100

## 2023-11-27 NOTE — PROGRESS NOTES
"HERACLIO Tolliver   [facility]  [address] [facility] [id number]      PATIENT NAME: Dimas Sales  : 1980  DATE: 23  MRN: [id number]        Reason for Visit / Chief Complaint: Follow-up (Lab review) and Medication Refill       History of Present Illness / Problem Focused Workflow     Dimas Sales presents to the clinic with Follow-up (Lab review) and Medication Refill     Initial Visit (2018): 38 y.o.  male presenting to the clinic to re-establish primary care. Previous PCP ANALIA Dallas NP. Last OV 2018. PMHx significant for HTN, T2DM, and HLD. Bp at goal. Currently taking Lisinopril 2.5 mg po daily. HgA1c 6.4% (2018). Currently taking Metformin  mg po daily. Denies s/s of hyper/hypoglycemia or neuropathy. Pt admits that recent eating habits have been poor. LDL 66. Currently taking Atorvastatin 10 mg po daily. Tolerating well. Working on losing weight. Took Adipex in the past with good results, however, he gained all weight back plus more after stopping drug. Wants to try to lose weight naturally. Denies fever, chills, HA, CP, SOB, or any other concerns today.     (2019): 38 y.o.  male presenting to the clinic for routine f/u. PMHx significant for HTN, T2DM, and HLD. Bp at goal. Currently taking Lisinopril 2.5 mg po daily. HgA1c 6.5%. Currently taking Metformin  mg po daily. Denies s/s of hyper/hypoglycemia. Admits occasional "shooting/burning" pain in BLE and feet. LDL 77. Currently taking Atorvastatin 10 mg po daily. Tolerating well. Pt still actively trying to lose weight. C/o right knee pain. Reports that he was at work last week and went to move a piece of iron by pushing it with the inner aspect of right foot, now, pt states that he believes he strained his right knee. C/o moderate, aching right knee pain. Denies erythema or joint swelling. Pain exacerbated with " "bending and extending right knee. Relieved with rest. Occasionally associated with "burning" sensation down right leg. Has not tried any remedies for pain. Denies fever, chills, HA, CP, SOB, or any other concerns today.     (6/24/19): 38 y.o.  male presenting to the clinic for routine f/u HTN, T2DM, and HLD. Now following [facility] for right knee pain. HgA1c 6.4%. Currently taking Metformin  mg po daily. Denies s/s of hyper/hypoglycemia. LDL 95. Currently taking Atorvastatin 10 mg po daily. Requesting medication refill. Today, pt c/o right shoulder pain. Reports hx of fall that sparked right shoulder pain. Pain would only occur on occasion are was largely controlled following the fall. However, pt reports an increase in pain over the last several weeks. He works in construction and admits heavy lifting, operating heavy machinery, and climbing. Pain reported as a throbbing pain that is moderate to severe in intensity. Often travels down right arm. Associated with numbness and tingling to distal right arm (also occurs in distal left arm). Exacerbated by strenuous activity. Relieved with NSAIDs and Gabapentin as needed. States, "I might need an injection in my shoulder." Denies CP or SOB. No other problems stated.     (6/12/2020): 39 y.o.  male with a PmHx of HTN, T2DM, HLD, and morbid obesity presenting for routine f/u. He's also following [facility] for right knee pain. Bp at goal. HgA1c 6.5%. Currently taking Metformin  mg po daily. Tolerating well. Denies s/s of hyper/hypoglycemia. . Currently prescribed Atorvastatin 10 mg po daily. He admits that he's not been 100% compliant with his medications. Pt c/o left foot/heel pain. He works in construction. He wears steel toe boots on a daily basis and stands on feet on concrete for the majority of the day. He climbs ladders often as well. He states that sometimes it hurts to even put his feet on the ground to begin walking in the morning. He " describes the pain as aching and burning. He's been prescribed Gabapentin in the past, which he reports helps with the pain, but he cannot take it all of the time because it causes him to be too drowsiness when he wakes for work in the am. He reports that he's tried interventions with a water bottle and tennis ball to relieve the pain to bottom of feet with minimal relief. He's also c/o a lesion to bridge of nose x many years, recently grown in size. States he had an aunt who had a similar lesion that tested + for skin cancer in the past. Works in the sun on most days. Rarely wears sun screen. Lastly, he's c/o darkening discoloration to distal lower ext. H/o cellulitis in the past in same region. Rarely swells. No calf pain or redness at this time. No other problems stated.     (12/18/2020): 40 y.o.  male with a PmHx of HTN, T2DM, HLD, plantar fasciitis, h/o COVID-19, and morbid obesity presenting for routine f/u. He's also following [facility] for right knee pain. Seen in ENT 12/16/2020. Previously referred from [facility] for BCC of nose (confirmed by bx). He had lesion excised 12/2 in ENT. States had stitches but one of them popped one day while lying down and rubbing on pillow. He presented back to ENT (not medicine clinic) requesting to have stitches removed/wound evaluated but he was told to go to ED for such. At present, lesion is scabbed.  He is to keep clean and apply Bacitracin ointment as prescribed. He apparently declined a referral for reconstructive sx. Will f/u in ENT in 2 weeks. Bp at goal. HgA1c 7.3%. Currently prescribed Metformin  mg po daily.  (goal <100). Admits not taking regularly, then, when he started taking ~1 week ago, he was taking 20 mg total of Lipitor as he had two bottles of the same medication. Pt admits that since having COVID-19 in July, he's not been taking his medications regularly. Admits that the virus took a toll on his physical and mental health. He felt  depressed being isolated from family, including , at the time. He subsequently lost his job (this had a major impact on depressed mood). Thus, he was not following his routine medical regimen or diet as a way to cope. At present, he denies SI/HI. He is finding some work off and on, and is making an effort to take medications as prescribed. He is amenable to receiving the influenza vaccine. CBC and LFTs were abnl during hospitalization with COVID-19 in July. Has not been re-assessed since. No other problems stated.     (2021): 40 y.o.  male with a PmHx of HTN, T2DM, HLD, plantar fasciitis, h/o COVID-19, and morbid obesity presenting for routine f/u. Bp at goal. HgA1c 8.1%. Currently prescribed Metformin  mg po daily. Admits stopped taking medication for a while. LDL 78 (goal <100). Prescribed Atorvastatin 10 mg po daily. Pt admits that since having COVID-19 in July, he's not been taking his medications regularly. Admits that the virus took a toll on his physical and mental health. He feels depressed due to losing his job (this had a major impact on depressed mood). Feels he can't provide for his family. Saved money is running out. Also c/o mx joint pains; specifically bilateral shoulders x years. States shoulders ache. Has trouble lifting things above his head. 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 Got denied for disability. Has re-applied; waiting for an answer. Gaining weight. Doesn't feel motivated to do much or exercise. At present, he denies SI/HI. Lastly, c/o cough productive of yellow mucous, nasal congestion, runny nose, itchy ears, crusty eyes x 2 weeks. States older daughter had symptoms first. Then a younger dtr and wife. His symptoms are lingering. No fever or chills at present but felt feverish at start of illness. No other problems stated.     (2021): 40 y.o.  male with a PmHx of HTN, T2DM, HLD, plantar  "fasciitis, h/o COVID-19, and morbid obesity presenting for routine f/u. Bp at goal. HgA1c 9.2%, increased from previous. Currently prescribed Metformin  mg po BID. Admits stopped taking medication for a while at last visit. Now, he admits skipping doses. He is not following an ADA diet. Has been eating "pizza and cakes." He is following Ortho for bilateral shoulder pain. Currently undergoing P.T. Bp elevated today. Taking Lisinopril 2.5 mg po daily. Originally started for renal protection given h/o DM. Denies HA, dizziness, weakness, numbness/tingling, CP, SOB, or any acute concerns. No other concerns.     (12/21/2021): 41 y.o.  male with a PmHx of HTN, T2DM, HLD, plantar fasciitis, h/o COVID-19, and morbid obesity presenting for routine f/u. Bp at goal. HgA1c 7.7%, improved from previous. FBG also significantly improved. Pt reports that he's making a conscious effort to make better food choices such as choosing sugar-free drinks over regular sodas. Also "cutting back" on intake overall. Currently prescribed Metformin ER 1,000 mg po BID. Tolerating medication better. Bp at goal. Declines vaccines. Following Ortho clinic for right shoulder pain. No other concerns.      Telemedicine Visit (3/22/2022): 41 y.o.  male with a PmHx of HTN, T2DM, HLD, plantar fasciitis, h/o COVID-19, and morbid obesity presenting for routine DM f/u. HgA1c 7.1%, improved from previous (9.2%, 7.7%). FBG also consistently improving from September assessment; now 140s. 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 He's been eating healthier on his new job. States eating salads. Reports losing ~20+ lbs from last visit. Total platelet count (initially elevated during COVID infection 7/2020), improving. Patient was previously instructed to start ASA 81 mg po daily, but admits forgetting to obtain. He is c/o intermittent, shocking right ankle pain and swelling. He has not tried any medications to relieve pain. Pain is " "worse with prolonged ambulation. Relieved with rest. States played sports in high school and can recall spraining ankle many times. No other concerns.     TV (7/22/2022): 41 y.o.  male with a PmHx of HTN, T2DM, HLD, plantar fasciitis, h/o COVID-19, elevated platelets (h/o splenectomy), and morbid obesity presenting for routine DM f/u. HgA1c 7.6%, worsened from previous. Prescribed Metformin 1,000 mg po BID but admits not taking as prescribed. Also reports "off of diet." One of his children had a birthday earlier this month. Admits had been eating a lot of sweets around that time. BP slightly elevated today. Asymptomatic. Prescribed Lisinopril 20 mg po daily but not taking as prescribed. Platelets improved to 404k, glucose 161,  (not taking statin), and microalb slightly elevated. He is due for eye and foot exam today. C/o intermittent throbbing, burning right foot/heel pain. 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 This exacerbates his pain. Known history of plantar fasciitis. States had CSI in the past with some relief. Uses topical Voltaren with mild relief.     TV (2/6/2023): 42 y.o.  male with a PmHx of HTN, T2DM, HLD, plantar fasciitis, h/o COVID-19, elevated platelets (h/o splenectomy), and morbid obesity presenting for routine DM f/u. At last visit, HgA1c was 7.6%, worsened from previous. Today, A1c is 12.1%. He was prescribed Metformin 1,000 mg po BID but admitted not taking as prescribed. Also reported "off of diet." Previously prescribed the ER form of Metformin due to past issues with diarrhea. He'd also declined additional medications at that time. Today, he's accompanied by his wife, Precious, who verbalizes that patient has not taken Metformin in close to one year. Patient then confirmed this, noting he really hadn't taken the Metformin because he started a new job that requires driving clients to and from [employer]. States not able to make " several stops to use the restroom so he stopped medication altogether. He also stopped his Atorvastatin and Lisinopril. BP slightly elevated today. Asymptomatic.      Wife mentions that patient's sister was diagnosed with colon cancer in her late 50s. States patient has not previously reported this. Patient is denying any GI issues and is not interested in a screening colonoscopy at this time.        Diabetes  He presents for his follow-up diabetic visit. He has type 2 diabetes mellitus. His disease course has been worsening. There are no hypoglycemic associated symptoms. Associated symptoms include polydipsia and polyuria. There are no hypoglycemic complications. There are no diabetic complications. Risk factors for coronary artery disease include diabetes mellitus, dyslipidemia, hypertension, male sex, obesity, sedentary lifestyle and stress. Current diabetic treatment includes oral agent (monotherapy). He is compliant with treatment none of the time. His weight is fluctuating minimally. He is following a generally unhealthy diet. When asked about meal planning, he reported none. He has not had a previous visit with a dietitian. He rarely participates in exercise. An ACE inhibitor/angiotensin II receptor blocker is not being taken. He does not see a podiatrist.Eye exam is not current.     5/3/23: Dimas is presenting for DM f/u. He was started on Ozempic at last visit. Currently taking 0.5 mg SQ weekly. Needs refill. He has lost 14 lbs since last visit. HgA1c 8.9%; previously 12.1%. [facility] average home CBGs around 120s. LDL 70s, decreased from one-teens in July 2022. He admits that he is not eating as healthy as he was a few months ago, but is trying to get back on track. Otherwise, he is pleased with the Ozempic. He denies any GI complaints or throat complaints.     He does briefly mention recurrent right shoulder pain that was evaluated in the past with XR and Ortho consult and for which he attended P.T. for.  Believes his new occupation, which involves driving a bus, is contributing to the recurrent discomfort. But admits not using the Mobic as prescribed and not sure he has time to pursue P.T. again at this time. He does have FROM of [facility].    He also reports continued aching pain to feet. Has been diagnosed with plantar fasciitis in the past. States feels similar. He is stretching with some relief. Interested in diabetic shoes.    8/23/23: Dimas is presenting for DM f/u. He was started on Ozempic at a previous visit, then it was titrated up in May. Currently taking 1 mg SQ weekly. He has lost 17 lbs since February. HgA1c 6.7%; previously 8.9%. Admits has not been assessing CBGs at home. FBG 130s on recent labs. He admits that he is not eating as healthy as he was a few months ago, but is trying to get back on track. Admits occasional constipation which he attributes to Ozempic, however, he has eaten prunes with relief. Otherwise, he is pleased with the Ozempic. He denies any other concerns, lump in throat, trouble swallowing, or SOB/CP.    No other concerns.     11/27/23: Dimas is presenting for DM f/u. He was started on Ozempic at a previous visit, then it was titrated up in May 2023. By August, he'd continued with Ozempic 1 mg SQ weekly. He he'd 17 lbs since February 2023, but gained about 4 lbs since last visit, leading to a net loss of 10 lbs since February. HgA1c 6.9%; previously 6.7%. Admits has not been assessing CBGs at home. FBG 120s on recent labs. Denies overt s/s of hypoglycemia. He admits that he is not eating as healthy as he was a few months ago, but is trying to get back on track. He denies any other concerns, lump in throat, trouble swallowing, or SOB/CP.    Admits picked up a 5-lb bag of corn last week and twisted his back. [place] [facility] [place].  was given a toradol shot and then prescribed an oral antiinflammatory and Prednisone which he was told to start today. Admits  "never started the Prednisone because "it make you gain weight." He admits that the Toradol was helpful and is inquiring about a prescription.    Due for foot/eye exam.    Diabetes  He presents for his follow-up diabetic visit. He has type 2 diabetes mellitus. His disease course has been improving. Pertinent negatives for hypoglycemia include no confusion, dizziness, headaches, seizures, speech difficulty or tremors. Pertinent negatives for diabetes include no chest pain, no polydipsia, no polyuria and no weakness. Risk factors for coronary artery disease include diabetes mellitus, dyslipidemia, male sex, obesity and hypertension. Current diabetic treatments: GLP-1.   Follow-up  Pertinent negatives include no chest pain, coughing, headaches, numbness or weakness.   Medication Refill  Pertinent negatives include no chest pain, coughing, headaches, numbness or weakness.       Review of Systems     Review of Systems   Constitutional: Negative.  Negative for activity change and unexpected weight change.   HENT: Negative.  Negative for hearing loss, rhinorrhea and trouble swallowing.    Eyes: Negative.  Negative for discharge and visual disturbance.   Respiratory: Negative.  Negative for cough, choking, chest tightness, shortness of breath and wheezing.    Cardiovascular: Negative.  Negative for chest pain, palpitations and leg swelling.   Gastrointestinal: Negative.  Negative for blood in stool, constipation and diarrhea.   Endocrine: Negative.  Negative for polydipsia and polyuria.   Genitourinary: Negative.  Negative for difficulty urinating, hematuria and urgency.   Musculoskeletal:  Positive for back pain.   Skin: Negative.    Allergic/Immunologic: Negative.    Neurological: Negative.  Negative for dizziness, tremors, seizures, syncope, facial asymmetry, speech difficulty, weakness, light-headedness, numbness and headaches.   Hematological: Negative.    Psychiatric/Behavioral: Negative.  Negative for confusion and " "dysphoric mood.        Medical / Social / Family History   History reviewed. No pertinent past medical history.    Past Surgical History:   Procedure Laterality Date    SPLENECTOMY, TOTAL         Social History    reports that he has never smoked. He has never been exposed to tobacco smoke. He has never used smokeless tobacco. He reports that he does not currently use alcohol after a past usage of about 2.0 standard drinks of alcohol per week. He reports that he does not use drugs.    Family History  's family history includes Cancer in his sister; Colon cancer in his sister; Diabetes in his father and mother; Hypertension in his father.    Medications and Allergies     Medications  Current Outpatient Medications   Medication Instructions    aspirin (ECOTRIN) 81 mg, Oral, Daily    atorvastatin (LIPITOR) 10 mg, Oral, Nightly    BD ULTRA-FINE MICRO PEN NEEDLE 32 gauge x 1/4" Ndle Subcutaneous    blood sugar diagnostic Strp Use daily to check blood sugar    ketorolac (TORADOL) 10 mg, Oral, Every 6 hours PRN    lisinopriL (PRINIVIL,ZESTRIL) 20 mg, Oral    loratadine (CLARITIN) 10 mg, Oral, Daily    ONETOUCH DELICA PLUS LANCET 30 gauge Misc USE 1 LANCET DAILY    ONETOUCH ULTRA2 METER Misc SMARTSIG:Via Meter Daily    OZEMPIC 2 mg, Subcutaneous, Every 7 days       Allergies  Review of patient's allergies indicates:  No Known Allergies    Physical Examination   Visit Vitals  /69 (BP Location: Left arm, Patient Position: Sitting, BP Method: Large (Automatic))   Pulse 87   Temp 98.1 °F (36.7 °C) (Oral)   Resp 20   Ht 5' 6" (1.676 m)   Wt (!) 166.2 kg (366 lb 6.4 oz)   BMI 59.14 kg/m²       Physical Exam  Constitutional:       Appearance: Normal appearance. He is obese.   HENT:      Head: Normocephalic and atraumatic.      Right Ear: External ear normal.      Left Ear: External ear normal.   Cardiovascular:      Pulses:           Dorsalis pedis pulses are 1+ on the right side and 1+ on the left side.   Pulmonary: "      Effort: Pulmonary effort is normal.   Musculoskeletal:         General: Normal range of motion.      Cervical back: Normal range of motion.      Lumbar back: Tenderness present.      Right lower leg: No edema.      Left lower leg: No edema.        Feet:    Feet:      Right foot:      Protective Sensation: 10 sites tested.  8 sites sensed.      Skin integrity: Dry skin present.      Toenail Condition: Right toenails are abnormally thick and long.      Left foot:      Protective Sensation: 10 sites tested.  8 sites sensed.      Skin integrity: Dry skin present.      Toenail Condition: Left toenails are abnormally thick and long.      Comments: Feet appear dusky  Neurological:      Mental Status: He is alert and oriented to person, place, and time.   Psychiatric:         Mood and Affect: Mood normal.         Behavior: Behavior normal.         Thought Content: Thought content normal.         Judgment: Judgment normal.           Results     Chemistry:  Lab Results   Component Value Date     11/16/2023    K 4.5 11/16/2023    CHLORIDE 101 11/16/2023    BUN 15.0 11/16/2023    CREATININE 0.83 11/16/2023    EGFRNORACEVR >60 11/16/2023    GLUCOSE 128 (H) 11/16/2023    CALCIUM 9.7 11/16/2023    ALKPHOS 81 11/16/2023    LABPROT 8.1 11/16/2023    ALBUMIN 4.1 11/16/2023    BILIDIR 0.3 03/16/2022    IBILI 0.30 03/16/2022    AST 16 11/16/2023    ALT 25 11/16/2023    MG 2.6 07/16/2020    PHOS 3.9 07/16/2020    VANAZQSK54IK 29.1 (L) 08/17/2023        Lab Results   Component Value Date    HGBA1C 6.9 11/16/2023        Hematology:  Lab Results   Component Value Date    WBC 9.85 08/17/2023    HGB 15.1 08/17/2023    HCT 47.6 08/17/2023     (H) 08/17/2023       Lipid Panel:  Lab Results   Component Value Date    CHOL 131 11/16/2023    HDL 38 11/16/2023    LDL 76.00 11/16/2023    TRIG 83 11/16/2023    TOTALCHOLEST 3 11/16/2023        Urine:  Lab Results   Component Value Date    COLORUA Yellow 07/22/2022    APPEARANCEUA  Clear 07/22/2022    SGUA 1.033 07/22/2022    PHUA 6.0 07/22/2022    PROTEINUA Trace (A) 07/22/2022    GLUCOSEUA Normal 07/22/2022    KETONESUA Negative 07/22/2022    BLOODUA Negative 07/22/2022    NITRITESUA Negative 07/22/2022    LEUKOCYTESUR Negative 07/22/2022    RBCUA 0-5 07/22/2022    WBCUA 0-5 07/22/2022    BACTERIA None Seen 07/22/2022    SQEPUA 2-20 06/11/2021    HYALINECASTS 0-2 (A) 07/22/2022    CREATRANDUR 161.8 11/16/2023          Assessment        ICD-10-CM ICD-9-CM   1. Type 2 diabetes mellitus with hyperglycemia, without long-term current use of insulin  E11.65 250.00     790.29   2. Screening for diabetic retinopathy  Z13.5 V80.2   3. Family history of colon cancer  Z80.0 V16.0   4. Acute bilateral low back pain without sciatica  M54.50 724.2     338.19   5. Dusky feet  R23.8 782.9   6. Callus of toe  L84 700   7. Overgrown toenails  L60.2 703.8        Plan (including Health Maintenance)     Problem List Items Addressed This Visit          Oncology    Family history of colon cancer    Current Assessment & Plan     Declines colon cancer screening             Endocrine    Type 2 diabetes mellitus with hyperglycemia - Primary    Overview     A1c level: 6.9% (11/2023), 6.7% (8/2023), 8.9% (5/3/23), 12.1% (2/6/23); 7.6% (7/2022), goal < 7%  CBG trends: none provided  Educated on diabetic diet: Low-fat, Low-carb, Low-cholesterol. Avoid sugary/dark drinks/sodas and white foods (i.e., bread, pasta, potatoes, rice)  Aerobic exercise: 20-30 min/day x 5 days/week  Diabetic Eye Exam: 7/22/22; 11/27/23  Diabetic Foot Exam: 7/22/22; 11/27/23  Microalbumin-U: 11/2023, improved to normal limits  Kidney Protection: Lisinopril  Statin Therapy: Atorvastatin  Educated on Hypoglycemic s/s and interventions. Call office with any questions or concerns  Strict glucose monitoring. Bring blood glucose logs/meter to each OV           Current Assessment & Plan     A1c at goal but rising as compared to previous. Will increase  Ozempic to 2 mg SQ weekly.  Continue other medications as prescribed  LDL at goal of < 100         Relevant Medications    semaglutide (OZEMPIC) 2 mg/dose (8 mg/3 mL) PnIj     Other Visit Diagnoses       Screening for diabetic retinopathy        Relevant Orders    Diabetic Eye Screening Photo (Completed)    Acute bilateral low back pain without sciatica        Relevant Medications    ketorolac (TORADOL) 10 mg tablet    Dusky feet        Relevant Orders    CV Ultrasound doppler arterial legs bilat    Callus of toe        Relevant Orders    Ambulatory referral/consult to Wound Clinic    Overgrown toenails        Relevant Orders    Ambulatory referral/consult to Wound Clinic          Patient mentioned that he is not wanting to complete vascular work-up or go to Wound Care until January due to not wanting to take time off of work due to limited help. Advised patient that if he developed loss of sensation or wounds to LE, he should visit the ER for eval.    For any new or worsening symptoms that are urgent, or for any s/s of MI, CVA, or any other emergent concerns, please visit the ER for further eval. Otherwise, call clinic with questions or concerns.        Health Maintenance Due   Topic Date Due    COVID-19 Vaccine (3 - 2023-24 season) 09/01/2023       Future Appointments   Date Time Provider Department Center   3/4/2024 12:15 PM Nae Sharpe FNP [facility]          Follow up in about 3 months (around 2/27/2024) for DM F/u.    Signature:  HERACLIO Tolliver  [facility]  [address] [facility] [id number]    Date of encounter: 11/27/23

## 2023-11-28 NOTE — PROGRESS NOTES
Dimas Sales is a 43 y.o. male here for a diabetic eye screening with non-dilated fundus photos per HERACLIO Tolliver.    Patient cooperative?: Yes  Small pupils?: No  Last eye exam: unknown    For exam results, see Encounter Report.

## 2023-11-29 DIAGNOSIS — E11.65 TYPE 2 DIABETES MELLITUS WITH HYPERGLYCEMIA, WITHOUT LONG-TERM CURRENT USE OF INSULIN: Primary | ICD-10-CM

## 2023-11-29 RX ORDER — LANCETS 33 GAUGE
EACH MISCELLANEOUS
Qty: 50 EACH | Refills: 3 | Status: SHIPPED | OUTPATIENT
Start: 2023-11-29 | End: 2023-11-30

## 2023-11-29 NOTE — TELEPHONE ENCOUNTER
LCV 11/27/23  NCV 3/4/24   No BM since 9/28 continue aggressive bowel measures may need digital exam and disimpaction  Continue to diuresis to decrease bowel edema

## 2023-11-30 RX ORDER — LANCETS 33 GAUGE
EACH MISCELLANEOUS
Qty: 50 EACH | Refills: 3 | Status: SHIPPED | OUTPATIENT
Start: 2023-11-30

## 2024-04-30 DIAGNOSIS — E11.65 TYPE 2 DIABETES MELLITUS WITH HYPERGLYCEMIA, WITHOUT LONG-TERM CURRENT USE OF INSULIN: ICD-10-CM

## 2024-05-06 ENCOUNTER — TELEPHONE (OUTPATIENT)
Dept: INTERNAL MEDICINE | Facility: CLINIC | Age: 44
End: 2024-05-06
Payer: MEDICAID

## 2024-05-07 RX ORDER — SEMAGLUTIDE 2.68 MG/ML
INJECTION, SOLUTION SUBCUTANEOUS
Qty: 3 ML | Refills: 0 | Status: SHIPPED | OUTPATIENT
Start: 2024-05-07 | End: 2024-06-11 | Stop reason: SDUPTHER

## 2024-06-06 DIAGNOSIS — E11.9 TYPE 2 DIABETES MELLITUS WITHOUT COMPLICATION, WITHOUT LONG-TERM CURRENT USE OF INSULIN: ICD-10-CM

## 2024-06-11 ENCOUNTER — OFFICE VISIT (OUTPATIENT)
Dept: INTERNAL MEDICINE | Facility: CLINIC | Age: 44
End: 2024-06-11
Payer: MEDICAID

## 2024-06-11 ENCOUNTER — LAB VISIT (OUTPATIENT)
Dept: LAB | Facility: HOSPITAL | Age: 44
End: 2024-06-11
Attending: NURSE PRACTITIONER
Payer: MEDICAID

## 2024-06-11 VITALS
BODY MASS INDEX: 50.62 KG/M2 | TEMPERATURE: 98 F | SYSTOLIC BLOOD PRESSURE: 118 MMHG | WEIGHT: 315 LBS | HEART RATE: 83 BPM | DIASTOLIC BLOOD PRESSURE: 75 MMHG | RESPIRATION RATE: 20 BRPM | HEIGHT: 66 IN

## 2024-06-11 DIAGNOSIS — J30.2 SEASONAL ALLERGIC RHINITIS, UNSPECIFIED TRIGGER: ICD-10-CM

## 2024-06-11 DIAGNOSIS — B35.1 ONYCHOMYCOSIS: ICD-10-CM

## 2024-06-11 DIAGNOSIS — E66.01 MORBID OBESITY: ICD-10-CM

## 2024-06-11 DIAGNOSIS — E78.5 HYPERLIPIDEMIA, UNSPECIFIED HYPERLIPIDEMIA TYPE: ICD-10-CM

## 2024-06-11 DIAGNOSIS — E11.65 TYPE 2 DIABETES MELLITUS WITH HYPERGLYCEMIA, WITHOUT LONG-TERM CURRENT USE OF INSULIN: Primary | ICD-10-CM

## 2024-06-11 DIAGNOSIS — E11.65 TYPE 2 DIABETES MELLITUS WITH HYPERGLYCEMIA, WITHOUT LONG-TERM CURRENT USE OF INSULIN: ICD-10-CM

## 2024-06-11 DIAGNOSIS — I10 HYPERTENSION, UNSPECIFIED TYPE: ICD-10-CM

## 2024-06-11 LAB
ALBUMIN SERPL-MCNC: 3.9 G/DL (ref 3.5–5)
ALBUMIN/GLOB SERPL: 1.1 RATIO (ref 1.1–2)
ALP SERPL-CCNC: 74 UNIT/L (ref 40–150)
ALT SERPL-CCNC: 25 UNIT/L (ref 0–55)
ANION GAP SERPL CALC-SCNC: 8 MEQ/L
AST SERPL-CCNC: 17 UNIT/L (ref 5–34)
BASOPHILS # BLD AUTO: 0.06 X10(3)/MCL
BASOPHILS NFR BLD AUTO: 0.6 %
BILIRUB SERPL-MCNC: 0.4 MG/DL
BUN SERPL-MCNC: 16.4 MG/DL (ref 8.9–20.6)
CALCIUM SERPL-MCNC: 9.8 MG/DL (ref 8.4–10.2)
CHLORIDE SERPL-SCNC: 101 MMOL/L (ref 98–107)
CO2 SERPL-SCNC: 30 MMOL/L (ref 22–29)
CREAT SERPL-MCNC: 0.9 MG/DL (ref 0.73–1.18)
CREAT/UREA NIT SERPL: 18
EOSINOPHIL # BLD AUTO: 0.1 X10(3)/MCL (ref 0–0.9)
EOSINOPHIL NFR BLD AUTO: 1 %
ERYTHROCYTE [DISTWIDTH] IN BLOOD BY AUTOMATED COUNT: 14.2 % (ref 11.5–17)
EST. AVERAGE GLUCOSE BLD GHB EST-MCNC: 142.7 MG/DL
GFR SERPLBLD CREATININE-BSD FMLA CKD-EPI: >60 ML/MIN/1.73/M2
GLOBULIN SER-MCNC: 3.7 GM/DL (ref 2.4–3.5)
GLUCOSE SERPL-MCNC: 148 MG/DL (ref 74–100)
HBA1C MFR BLD: 6.6 %
HCT VFR BLD AUTO: 47 % (ref 42–52)
HGB BLD-MCNC: 15.1 G/DL (ref 14–18)
IMM GRANULOCYTES # BLD AUTO: 0.04 X10(3)/MCL (ref 0–0.04)
IMM GRANULOCYTES NFR BLD AUTO: 0.4 %
LYMPHOCYTES # BLD AUTO: 2.45 X10(3)/MCL (ref 0.6–4.6)
LYMPHOCYTES NFR BLD AUTO: 24.6 %
MCH RBC QN AUTO: 28.7 PG (ref 27–31)
MCHC RBC AUTO-ENTMCNC: 32.1 G/DL (ref 33–36)
MCV RBC AUTO: 89.4 FL (ref 80–94)
MONOCYTES # BLD AUTO: 0.96 X10(3)/MCL (ref 0.1–1.3)
MONOCYTES NFR BLD AUTO: 9.7 %
NEUTROPHILS # BLD AUTO: 6.33 X10(3)/MCL (ref 2.1–9.2)
NEUTROPHILS NFR BLD AUTO: 63.7 %
NRBC BLD AUTO-RTO: 0 %
PLATELET # BLD AUTO: 415 X10(3)/MCL (ref 130–400)
PMV BLD AUTO: 9.1 FL (ref 7.4–10.4)
POTASSIUM SERPL-SCNC: 4.3 MMOL/L (ref 3.5–5.1)
PROT SERPL-MCNC: 7.6 GM/DL (ref 6.4–8.3)
RBC # BLD AUTO: 5.26 X10(6)/MCL (ref 4.7–6.1)
SODIUM SERPL-SCNC: 139 MMOL/L (ref 136–145)
WBC # SPEC AUTO: 9.94 X10(3)/MCL (ref 4.5–11.5)

## 2024-06-11 PROCEDURE — 80053 COMPREHEN METABOLIC PANEL: CPT

## 2024-06-11 PROCEDURE — 99213 OFFICE O/P EST LOW 20 MIN: CPT | Mod: PBBFAC | Performed by: NURSE PRACTITIONER

## 2024-06-11 PROCEDURE — 85025 COMPLETE CBC W/AUTO DIFF WBC: CPT

## 2024-06-11 PROCEDURE — 1160F RVW MEDS BY RX/DR IN RCRD: CPT | Mod: CPTII,,, | Performed by: NURSE PRACTITIONER

## 2024-06-11 PROCEDURE — 3008F BODY MASS INDEX DOCD: CPT | Mod: CPTII,,, | Performed by: NURSE PRACTITIONER

## 2024-06-11 PROCEDURE — 3074F SYST BP LT 130 MM HG: CPT | Mod: CPTII,,, | Performed by: NURSE PRACTITIONER

## 2024-06-11 PROCEDURE — 99214 OFFICE O/P EST MOD 30 MIN: CPT | Mod: S$PBB,,, | Performed by: NURSE PRACTITIONER

## 2024-06-11 PROCEDURE — 83036 HEMOGLOBIN GLYCOSYLATED A1C: CPT

## 2024-06-11 PROCEDURE — 36415 COLL VENOUS BLD VENIPUNCTURE: CPT

## 2024-06-11 PROCEDURE — 1159F MED LIST DOCD IN RCRD: CPT | Mod: CPTII,,, | Performed by: NURSE PRACTITIONER

## 2024-06-11 PROCEDURE — 3078F DIAST BP <80 MM HG: CPT | Mod: CPTII,,, | Performed by: NURSE PRACTITIONER

## 2024-06-11 RX ORDER — LANCETS 33 GAUGE
EACH MISCELLANEOUS
Qty: 50 EACH | Refills: 3 | Status: SHIPPED | OUTPATIENT
Start: 2024-06-11

## 2024-06-11 RX ORDER — ATORVASTATIN CALCIUM 10 MG/1
10 TABLET, FILM COATED ORAL NIGHTLY
Qty: 90 TABLET | Refills: 1 | Status: SHIPPED | OUTPATIENT
Start: 2024-06-11

## 2024-06-11 RX ORDER — TERBINAFINE HYDROCHLORIDE 250 MG/1
250 TABLET ORAL DAILY
Qty: 90 TABLET | Refills: 0 | Status: SHIPPED | OUTPATIENT
Start: 2024-06-11 | End: 2024-09-09

## 2024-06-11 RX ORDER — LORATADINE 10 MG/1
10 TABLET ORAL DAILY
Qty: 90 TABLET | Refills: 1 | Status: SHIPPED | OUTPATIENT
Start: 2024-06-11

## 2024-06-11 RX ORDER — SEMAGLUTIDE 2.68 MG/ML
2 INJECTION, SOLUTION SUBCUTANEOUS WEEKLY
Qty: 3 ML | Refills: 6 | Status: SHIPPED | OUTPATIENT
Start: 2024-06-11

## 2024-06-11 RX ORDER — PEN NEEDLE, DIABETIC 32 GX 1/4"
1 NEEDLE, DISPOSABLE MISCELLANEOUS 2 TIMES DAILY
Qty: 100 EACH | Refills: 3 | Status: SHIPPED | OUTPATIENT
Start: 2024-06-11

## 2024-06-11 RX ORDER — LISINOPRIL 20 MG/1
20 TABLET ORAL DAILY
Qty: 90 TABLET | Refills: 1 | Status: SHIPPED | OUTPATIENT
Start: 2024-06-11

## 2024-06-11 NOTE — PROGRESS NOTES
"  [facility]  Carl Carpenter DNP     Patient ID: [id number]     Chief Complaint: Follow-up (Needs refills, not fasting)      HPI:     Dimas Sales is a 43 y.o. male here today for follow up with PCP.      PMH: HTN, DM II, HLD, and obesity.     Health Maintenance         Date Due Completion Date    Hemoglobin A1c 02/16/2024 11/16/2023    Diabetes Urine Screening 11/16/2024 11/16/2023    Lipid Panel 11/16/2024 11/16/2023    Foot Exam 11/27/2024 11/27/2023    Override on 11/27/2023: Done    Override on 7/22/2022: Done    Eye Exam 11/27/2024 11/27/2023    Low Dose Statin 06/11/2025 6/11/2024    TETANUS VACCINE 09/11/2027 9/11/2017    Pneumococcal Vaccines (Age 0-64) (3 of 3 - PPSV23 or PCV20) 09/24/2047 6/23/2015            History reviewed. No pertinent past medical history.     Past Surgical History:   Procedure Laterality Date    SPLENECTOMY, TOTAL          Social History     Tobacco Use    Smoking status: Never     Passive exposure: Never    Smokeless tobacco: Never   Substance and Sexual Activity    Alcohol use: Not Currently     Alcohol/week: 2.0 standard drinks of alcohol     Types: 2 Cans of beer per week    Drug use: Never    Sexual activity: Yes     Partners: Female        Current Outpatient Medications   Medication Instructions    aspirin (ECOTRIN) 81 mg, Oral, Daily    atorvastatin (LIPITOR) 10 mg, Oral, Nightly    BD ULTRA-FINE MICRO PEN NEEDLE 32 gauge x 1/4" Ndle 1 applicator, Subcutaneous, 2 times daily    blood sugar diagnostic (ONETOUCH ULTRA TEST) Strp USE 1 STRIP TO CHECK GLUCOSE ONCE DAILY TO  CHECK  BLOOD  SUGAR    lisinopriL (PRINIVIL,ZESTRIL) 20 mg, Oral, Daily    loratadine (CLARITIN) 10 mg, Oral, Daily    ONETOUCH DELICA PLUS LANCET 30 gauge Misc USE 1 LANCET ONCE DAILY    ONETOUCH ULTRA2 METER Misc SMARTSIG:Via Meter Daily    OZEMPIC 2 mg, Subcutaneous, Weekly    terbinafine HCL (LAMISIL) 250 mg, Oral, Daily       Review of patient's allergies indicates:  No Known " "Allergies     Patient Care Team:  Nae Sharpe FNP as PCP - General (Family Medicine)     Subjective:     Review of Systems   Constitutional:  Negative for appetite change, chills, diaphoresis and fever.   HENT:  Negative for ear pain, sinus pain and sore throat.    Eyes:  Negative for pain and visual disturbance.   Respiratory:  Negative for cough, shortness of breath and wheezing.    Cardiovascular:  Negative for chest pain, palpitations and leg swelling.   Gastrointestinal:  Negative for abdominal pain, blood in stool, diarrhea, nausea and vomiting.   Endocrine: Negative for cold intolerance.   Genitourinary:  Negative for difficulty urinating, dysuria, frequency and hematuria.   Musculoskeletal:  Negative for arthralgias, joint swelling and myalgias.   Skin:  Negative for color change and rash.        Thick yellow toenails    Allergic/Immunologic: Negative.    Neurological: Negative.  Negative for dizziness, syncope, light-headedness and numbness.   Hematological: Negative.    Psychiatric/Behavioral: Negative.  Negative for dysphoric mood and suicidal ideas. The patient is not nervous/anxious.    All other systems reviewed and are negative.      12 point review of systems conducted, negative except as stated in the history of present illness. See HPI for details.    Objective:     Visit Vitals  /75 (BP Location: Right arm, Patient Position: Sitting, BP Method: X-Large (Automatic))   Pulse 83   Temp 98.3 °F (36.8 °C) (Oral)   Resp 20   Ht 5' 5.98" (1.676 m)   Wt (!) 166.6 kg (367 lb 3.2 oz)   BMI 59.30 kg/m²       Physical Exam  Vitals and nursing note reviewed.   Constitutional:       General: He is not in acute distress.     Appearance: He is not ill-appearing.   HENT:      Head: Normocephalic and atraumatic.      Mouth/Throat:      Mouth: Mucous membranes are moist.      Pharynx: Oropharynx is clear.   Eyes:      General: No scleral icterus.     Extraocular Movements: Extraocular movements intact.    "   Conjunctiva/sclera: Conjunctivae normal.      Pupils: Pupils are equal, round, and reactive to light.   Neck:      Vascular: No carotid bruit.   Cardiovascular:      Rate and Rhythm: Normal rate and regular rhythm.      Heart sounds: No murmur heard.     No friction rub. No gallop.   Pulmonary:      Effort: Pulmonary effort is normal. No respiratory distress.      Breath sounds: Normal breath sounds. No wheezing, rhonchi or rales.   Abdominal:      General: Abdomen is flat. Bowel sounds are normal. There is no distension.      Palpations: Abdomen is soft. There is no mass.      Tenderness: There is no abdominal tenderness.   Musculoskeletal:         General: Normal range of motion.      Cervical back: Normal range of motion and neck supple.   Feet:      Comments: Thick yellow toenails with splinting, all toes bilateral feet.  Fungal findings noted.  Skin:     General: Skin is warm and dry.   Neurological:      General: No focal deficit present.      Mental Status: He is alert.   Psychiatric:         Mood and Affect: Mood normal.         Labs Reviewed:     Chemistry:  Lab Results   Component Value Date     11/16/2023    K 4.5 11/16/2023    BUN 15.0 11/16/2023    CREATININE 0.83 11/16/2023    EGFRNORACEVR >60 11/16/2023    GLUCOSE 128 (H) 11/16/2023    CALCIUM 9.7 11/16/2023    ALKPHOS 81 11/16/2023    LABPROT 8.1 11/16/2023    ALBUMIN 4.1 11/16/2023    BILIDIR 0.3 03/16/2022    IBILI 0.30 03/16/2022    AST 16 11/16/2023    ALT 25 11/16/2023    MG 2.6 07/16/2020    PHOS 3.9 07/16/2020    [id number] 29.1 (L) 08/17/2023    TSH 1.655 08/17/2023    PSA 0.37 08/17/2023        Lab Results   Component Value Date    HGBA1C 6.9 11/16/2023        Hematology:  Lab Results   Component Value Date    WBC 9.85 08/17/2023    HGB 15.1 08/17/2023    HCT 47.6 08/17/2023     (H) 08/17/2023       Lipid Panel:  Lab Results   Component Value Date    CHOL 131 11/16/2023    HDL 38 11/16/2023    LDL 76.00 11/16/2023    TRIG 83  "11/16/2023    TOTALCHOLEST 3 11/16/2023        Urine:  Lab Results   Component Value Date    APPEARANCEUA Clear 07/22/2022    SGUA 1.033 07/22/2022    PROTEINUA Trace (A) 07/22/2022    KETONESUA Negative 07/22/2022    LEUKOCYTESUR Negative 07/22/2022    RBCUA 0-5 07/22/2022    WBCUA 0-5 07/22/2022    BACTERIA None Seen 07/22/2022    SQEPUA 2-20 06/11/2021    HYALINECASTS 0-2 (A) 07/22/2022    CREATRANDUR 161.8 11/16/2023        Assessment:       ICD-10-CM ICD-9-CM   1. Type 2 diabetes mellitus with hyperglycemia, without long-term current use of insulin  E11.65 250.00     790.29   2. Hyperlipidemia, unspecified hyperlipidemia type  E78.5 272.4   3. Hypertension, unspecified type  I10 401.9   4. Morbid obesity  E66.01 278.01   5. Onychomycosis  B35.1 110.1   6. Seasonal allergic rhinitis, unspecified trigger  J30.2 477.9        Plan:     1. Type 2 diabetes mellitus with hyperglycemia, without long-term current use of insulin  Assessment & Plan:  Lab Results   Component Value Date    HGBA1C 6.9 11/16/2023    HGBA1C 6.7 08/17/2023    LDL 76.00 11/16/2023    CREATININE 0.83 11/16/2023      Diabetes Medications               semaglutide (OZEMPIC) 2 mg/dose (8 mg/3 mL) PnIj Inject 2 mg into the skin once a week.          On ACE and Statin according to ADA guidelines.  Follow ADA Diet. Avoid soda, simple sweets, and limit rice/pasta/breads/starches (no more than 45-50 grams per meal).  Maintain healthy weight with goal BMI <30.  Exercise 5 times per week for 30 minutes per day.  Stressed importance of daily foot exams and to wear approved DM shoes.   Stressed importance of annual dilated eye exam.          Orders:  -     semaglutide (OZEMPIC) 2 mg/dose (8 mg/3 mL) PnIj; Inject 2 mg into the skin once a week.  Dispense: 3 mL; Refill: 6  -     BD ULTRA-FINE MICRO PEN NEEDLE 32 gauge x 1/4" Ndle; Inject 1 applicator into the skin 2 (two) times a day.  Dispense: 100 each; Refill: 3  -     blood sugar diagnostic (ONETOUCH ULTRA " TEST) Strp; USE 1 STRIP TO CHECK GLUCOSE ONCE DAILY TO  CHECK  BLOOD  SUGAR  Dispense: 50 each; Refill: 0  -     ONETOUCH DELICA PLUS LANCET 30 gauge Misc; USE 1 LANCET ONCE DAILY  Dispense: 50 each; Refill: 3  -     Hemoglobin A1C; Future; Expected date: 06/11/2024  -     Comprehensive Metabolic Panel; Future; Expected date: 12/11/2024  -     Hemoglobin A1C; Future; Expected date: 12/11/2024    2. Hyperlipidemia, unspecified hyperlipidemia type  Overview:  Atorvastatin 10 mg po daily    Assessment & Plan:  Counseled for low-sodium, low carb, low-cholesterol, good fat, Dash diet.  Recommend increasing fiber in the diet to lower LDL, increasing fish oil and fish such as salmon may help increase HDL good cholesterol.  Increasing aerobic activity as tolerated may also help lower LDL.  Healthy weight maintenance is advised, portion control, calorie counting, and discussed difference between complex carbs and simple carbs.    Continue current statin therapy.      Orders:  -     atorvastatin (LIPITOR) 10 MG tablet; Take 1 tablet (10 mg total) by mouth every evening.  Dispense: 90 tablet; Refill: 1  -     Lipid Panel; Future; Expected date: 12/11/2024    3. Hypertension, unspecified type  Overview:  Lisinopril 20 mg po daily    Assessment & Plan:  Goal BP < 140/90, best goal is BP <130/80 consistently   Reduce the amount of salt in your diet, follow a 2 gm sodium, DASH diet daily            Lose weight if you are overweight or have obesity  Avoid drinking too much alcohol  Stop smoking  Exercise at least 30 minutes per day most days of the week      Orders:  -     lisinopriL (PRINIVIL,ZESTRIL) 20 MG tablet; Take 1 tablet (20 mg total) by mouth once daily.  Dispense: 90 tablet; Refill: 1  -     TSH; Future; Expected date: 12/11/2024  -     Urinalysis; Future; Expected date: 12/11/2024  -     T4, Free; Future; Expected date: 12/11/2024  -     Microalbumin/Creatinine Ratio, Urine; Future; Expected date: 12/11/2024  -     CBC  Auto Differential; Future; Expected date: 06/11/2024    4. Morbid obesity  Assessment & Plan:  Discussed importance of healthy weight maintenance and disease prevention.  Advised of low carb, low-fat, low-cholesterol, good fat diet, and importance of portion control measures.  Discussed drinking plenty of water daily, getting plenty of sleep nightly, stress reduction, and addressing any underlying lifestyle habits or past/recent stressors that may be contributing to obesity.  Advised of importance of getting 20-30 minutes of aerobic activity and daily.        5. Onychomycosis  Assessment & Plan:  He would like to treat with terbinafine.  Explained this medication is taken once daily for 12 weeks and he must not have any alcohol at any point in time while taking this medication due to the risk of liver failure and other complications while taking this medication.  He agrees not to have any alcohol, check labs every 6-8 weeks and take the medication once a day.    Orders:  -     Comprehensive Metabolic Panel; Future; Expected date: 06/11/2024  -     CBC Auto Differential; Future; Expected date: 12/11/2024  -     terbinafine HCL (LAMISIL) 250 mg tablet; Take 1 tablet (250 mg total) by mouth once daily.  Dispense: 90 tablet; Refill: 0  -     CBC Auto Differential; Future; Expected date: 07/23/2024  -     Comprehensive Metabolic Panel; Future; Expected date: 07/23/2024    6. Seasonal allergic rhinitis, unspecified trigger  -     loratadine (CLARITIN) 10 mg tablet; Take 1 tablet (10 mg total) by mouth Daily.  Dispense: 90 tablet; Refill: 1         Follow up in about 6 months (around 12/11/2024) for follow up, with lab work prior to visit. In addition to their scheduled follow up, the patient has also been instructed to follow up on as needed basis.     Future Appointments   Date Time Provider Department Center   6/11/2024 10:20 AM [facility] [place]    12/17/2024  8:20 AM Carl Carpenetr, [facility]  Khalif Carpenter, P

## 2024-12-16 DIAGNOSIS — E11.65 TYPE 2 DIABETES MELLITUS WITH HYPERGLYCEMIA, WITHOUT LONG-TERM CURRENT USE OF INSULIN: ICD-10-CM

## 2024-12-16 RX ORDER — SEMAGLUTIDE 2.68 MG/ML
2 INJECTION, SOLUTION SUBCUTANEOUS WEEKLY
Qty: 3 ML | Refills: 6 | Status: SHIPPED | OUTPATIENT
Start: 2024-12-16

## 2025-01-27 ENCOUNTER — TELEPHONE (OUTPATIENT)
Dept: FAMILY MEDICINE | Facility: CLINIC | Age: 45
End: 2025-01-27
Payer: MEDICAID

## 2025-01-27 NOTE — TELEPHONE ENCOUNTER
Reviewed patient's chart, PA  for Rx Ozempic approved. Called and spoke with patient's pharmacy regarding PA approval. Pharmacy will fill Rx. Called and spoke with patient. Verified . Patient made aware of medication PA approved. Verbalized understanding.

## 2025-01-27 NOTE — TELEPHONE ENCOUNTER
----- Message from Ronda sent at 1/24/2025  3:56 PM CST -----  .Who Called: Dimas Sales        Preferred Method of Contact: Phone Call  Patient's Preferred Phone Number on File: [phone]   Best Call Back Number, if different:  Additional Information: semaglutide (OZEMPIC) 2 mg/dose (8 mg/3 mL) PnIj needs PA

## 2025-04-08 NOTE — PROGRESS NOTES
Karena Lam, HERACLIO   [facility]  [address] [facility] [address]      PATIENT NAME: Dimas Sales  : 1980  DATE: 4/10/25  MRN: [id number]      Reason for Visit / Chief Complaint: Establish Care (Pt here to est care for HTN, T2DM, and HLD)       History of Present Illness / Problem Focused Workflow     Dimas Sales presents to the clinic with Establish Care (Pt here to est care for HTN, T2DM, and HLD)     43 yo male presents to clinic. Medical problems include HTN, DM, HLD, obesity.     4/10/25  Pt presents to establish care, he was previously followed by Carl SNYDER with LOV 2024. Last A1C was 6.6 at the time of last visit. Labs incomplete. He is reporting he is not currently taking any of his medications other than ozempic due to not feeling like he needs it. He is fasting and agreeable to complete labs today with virtual follow up tomorrow. Encouraged him to monitor BP at home and report any readings >140/90, currently at goal without medication. He is asking about adipex for weight loss and energy, reviewed I do not recommend as he is currently not making weight loss efforts with diet and exercise. He reports understanding. Agreeable to DM eye and foot exam.           Review of Systems     Review of Systems   Constitutional:  Negative for fatigue, fever and unexpected weight change.   HENT:  Negative for ear pain, hearing loss, trouble swallowing and voice change.    Respiratory:  Negative for cough and shortness of breath.    Cardiovascular:  Negative for chest pain and palpitations.   Gastrointestinal:  Negative for abdominal pain, diarrhea and vomiting.   Genitourinary:  Negative for dysuria.   Musculoskeletal:  Negative for gait problem.   Skin:  Negative for rash and wound.   Neurological:  Negative for weakness.   Psychiatric/Behavioral:  Negative for suicidal ideas.          Medications and Allergies  Pt's family - accompanied by patient on speaker phone called after hours on 11/06/2022; pt with progressive auditory hallucinations; hiding in the closet for fear that \"he's going to kill me today\" in reference to her neighbor. She is hearing the voices when she is not at home as well. He talks to her about her family members and says he will kill them all. She has been sleeping at her daughter's at night, but still complains of significant anxiety and insomnia. She is under the care of Dr. Duran Beasley, psychiatry. Has been on Abilify 15mg daily and Trazadone; she is not taking the latter routinely. I personally called [facility] for voluntary admission; pt denies SI/HI. Intake appointment made for 11/07/22 at 12noon. The family will accompany her to the intake appointment.     Frank Carson NP, APRN - CNP "    Medications  Medication List with Changes/Refills   Current Medications    ASPIRIN (ECOTRIN) 81 MG EC TABLET    Take 81 mg by mouth Daily.    ATORVASTATIN (LIPITOR) 10 MG TABLET    Take 1 tablet (10 mg total) by mouth every evening.    BD ULTRA-FINE MICRO PEN NEEDLE 32 GAUGE X 1/4" NDLE    Inject 1 applicator into the skin 2 (two) times a day.    BLOOD SUGAR DIAGNOSTIC (ONETOUCH ULTRA TEST) STRP    USE 1 STRIP TO CHECK GLUCOSE ONCE DAILY TO  CHECK  BLOOD  SUGAR    LISINOPRIL (PRINIVIL,ZESTRIL) 20 MG TABLET    Take 1 tablet (20 mg total) by mouth once daily.    LORATADINE (CLARITIN) 10 MG TABLET    Take 1 tablet (10 mg total) by mouth Daily.    ONETOUCH DELICA PLUS LANCET 30 GAUGE MISC    USE 1 LANCET ONCE DAILY    ONETOUCH ULTRA2 METER MISC    SMARTSIG:Via Meter Daily    SEMAGLUTIDE (OZEMPIC) 2 MG/DOSE (8 MG/3 ML) PNIJ    Inject 2 mg into the skin once a week.         Allergies  Review of patient's allergies indicates:  No Known Allergies    Physical Examination     Vitals:    04/10/25 0824   BP: 129/84   Pulse: 88   Resp: 18     Physical Exam  Constitutional:       Appearance: Normal appearance.   Cardiovascular:      Rate and Rhythm: Normal rate and regular rhythm.      Pulses:           Dorsalis pedis pulses are 2+ on the right side and 2+ on the left side.        Posterior tibial pulses are 2+ on the right side and 2+ on the left side.   Pulmonary:      Effort: Pulmonary effort is normal.      Breath sounds: Normal breath sounds.   Musculoskeletal:         General: Normal range of motion.      Cervical back: Normal range of motion.   Feet:      Right foot:      Protective Sensation: 9 sites tested.  9 sites sensed.      Skin integrity: Skin integrity normal.      Toenail Condition: Right toenails are normal.      Left foot:      Protective Sensation: 9 sites tested.  9 sites sensed.      Skin integrity: Skin integrity normal.      Toenail Condition: Left toenails are normal.   Skin:     General: Skin is warm " and dry.   Neurological:      General: No focal deficit present.      Mental Status: He is alert and oriented to person, place, and time.   Psychiatric:         Mood and Affect: Mood normal.           Results     Lab Results   Component Value Date    WBC 9.94 06/11/2024    RBC 5.26 06/11/2024    HGB 15.1 06/11/2024    HCT 47.0 06/11/2024    MCV 89.4 06/11/2024    MCH 28.7 06/11/2024    MCHC 32.1 (L) 06/11/2024    RDW 14.2 06/11/2024     (H) 06/11/2024    MPV 9.1 06/11/2024     Sodium   Date Value Ref Range Status   06/11/2024 139 136 - 145 mmol/L Final     Potassium   Date Value Ref Range Status   06/11/2024 4.3 3.5 - 5.1 mmol/L Final     Chloride   Date Value Ref Range Status   06/11/2024 101 98 - 107 mmol/L Final     CO2   Date Value Ref Range Status   06/11/2024 30 (H) 22 - 29 mmol/L Final     Blood Urea Nitrogen   Date Value Ref Range Status   06/11/2024 16.4 8.9 - 20.6 mg/dL Final     Creatinine   Date Value Ref Range Status   06/11/2024 0.90 0.73 - 1.18 mg/dL Final     Calcium   Date Value Ref Range Status   06/11/2024 9.8 8.4 - 10.2 mg/dL Final     Albumin   Date Value Ref Range Status   06/11/2024 3.9 3.5 - 5.0 g/dL Final     Bilirubin Total   Date Value Ref Range Status   06/11/2024 0.4 <=1.5 mg/dL Final     ALP   Date Value Ref Range Status   06/11/2024 74 40 - 150 unit/L Final     AST   Date Value Ref Range Status   06/11/2024 17 5 - 34 unit/L Final     ALT   Date Value Ref Range Status   06/11/2024 25 0 - 55 unit/L Final     Estimated GFR-Non    Date Value Ref Range Status   07/18/2022 >60 mls/min/1.73/m2 Final     Lab Results   Component Value Date    CHOL 131 11/16/2023     Lab Results   Component Value Date    HDL 38 11/16/2023     Lab Results   Component Value Date    TRIG 83 11/16/2023     Lab Results   Component Value Date    VLDL 17 11/16/2023     Lab Results   Component Value Date    LDL 76.00 11/16/2023     Lab Results   Component Value Date    TSH 1.655 08/17/2023     Lab  Results   Component Value Date    PHUR 6.0 07/22/2022    PROTEINUA Trace (A) 07/22/2022    GLUCUA Normal 07/22/2022    KETONESU Negative 06/11/2021    OCCULTUA Negative 07/22/2022    NITRITE Negative 07/22/2022    LEUKOCYTESUR Negative 07/22/2022     Lab Results   Component Value Date    HGBA1C 6.6 06/11/2024    HGBA1C 6.9 11/16/2023    HGBA1C 6.7 08/17/2023           Assessment         ICD-10-CM ICD-9-CM   1. Type 2 diabetes mellitus with hyperglycemia, without long-term current use of insulin  E11.65 250.00     790.29   2. Mixed hyperlipidemia  E78.2 272.2   3. Well adult exam  Z00.00 V70.0   4. Skin lesion  L98.9 709.9   5. Hypertension, unspecified type  I10 401.9   6. Class 3 severe obesity due to excess calories with serious comorbidity and body mass index (BMI) of 60.0 to 69.9 in adult  E66.813 278.01    E66.01 V85.44    Z68.44        Plan      Problem List Items Addressed This Visit       Hyperlipidemia    Current Assessment & Plan   FLP ordered, no current medication  Stressed importance of dietary modifications. Follow a low cholesterol, low saturated fat diet with less that 200mg of cholesterol a day.  Avoid fried foods and high saturated fats (high saturated fats less than 7% of calories).  Add Flax Seed/Fish Oil supplements to diet. Increase dietary fiber.  Regular exercise can reduce LDL and raise HDL. Stressed importance of physical activity 5 times per week for 30 minutes per day.            Relevant Orders    Lipid Panel    Hypertension    Current Assessment & Plan   At goal without medication  Low Sodium Diet (DASH Diet - Less than 2 grams of sodium per day).  Monitor blood pressure daily and log. Report consistent numbers greater than 140/90.  Maintain healthy weight with goal BMI <30. Exercise 30 minutes per day, 5 days per week.         Class 3 severe obesity due to excess calories with serious comorbidity and body mass index (BMI) of 60.0 to 69.9 in adult    Current Assessment & Plan   Body  mass index is 60.26 kg/m².  Goal BMI <30.  Exercise 5 times a week for 30 minutes per day.  Avoid soda, simple sugars, excessive rice, potatoes or bread. Limit fast foods and fried foods.  Choose complex carbs in moderation (example: green vegetables, beans, oatmeal). Eat plenty of fresh fruits and vegetables with lean meats daily.  Do not skip meals. Eat a balanced portion size.  Avoid fad diets. Consider permanent healthy life style changes.            Type 2 diabetes mellitus with hyperglycemia - Primary    Current Assessment & Plan   A1C ordered  Continue ozempic 2mg  Follow ADA Diet. Avoid soda, simple sweets, and limit rice/pasta/breads/starches (no more than 45-50 grams per meal).  Maintain healthy weight with goal BMI <30.  Exercise 5 times per week for 30 minutes per day.  Stressed importance of daily foot exams.  Stressed importance of annual dilated eye exam.           Relevant Orders    Comprehensive Metabolic Panel    Hemoglobin A1C    Microalbumin/Creatinine Ratio, Urine    Diabetic Eye Screening Photo    Ambulatory referral/consult to Ophthalmology    Skin lesion    Current Assessment & Plan   On face- hx BCC  Referral to [facility] medicine clinic for possible biopsy         Relevant Orders    Ambulatory referral/consult to Family Practice     Other Visit Diagnoses         Well adult exam        Relevant Orders    CBC Auto Differential    Comprehensive Metabolic Panel    Hemoglobin A1C    Lipid Panel    TSH    T4, Free    Vitamin D    Urinalysis, Reflex to Urine Culture            Future Appointments   Date Time Provider Department Center   4/10/2025  9:20 AM LAB, [facility]   4/10/2025  9:30 AM [facility] INTERNAL, DIABETIC EYE [facility]   4/11/2025  7:20 AM Karena Lam, HERACLIO [facility]            Signature:      [facility]  [address] [facility] [address]    Date of encounter: 4/10/25

## 2025-04-10 ENCOUNTER — OFFICE VISIT (OUTPATIENT)
Dept: INTERNAL MEDICINE | Facility: CLINIC | Age: 45
End: 2025-04-10
Payer: MEDICAID

## 2025-04-10 ENCOUNTER — LAB VISIT (OUTPATIENT)
Dept: LAB | Facility: HOSPITAL | Age: 45
End: 2025-04-10
Payer: MEDICAID

## 2025-04-10 ENCOUNTER — RESULTS FOLLOW-UP (OUTPATIENT)
Dept: INTERNAL MEDICINE | Facility: CLINIC | Age: 45
End: 2025-04-10

## 2025-04-10 ENCOUNTER — CLINICAL SUPPORT (OUTPATIENT)
Dept: INTERNAL MEDICINE | Facility: CLINIC | Age: 45
End: 2025-04-10
Payer: MEDICAID

## 2025-04-10 VITALS
HEIGHT: 65 IN | WEIGHT: 315 LBS | OXYGEN SATURATION: 96 % | DIASTOLIC BLOOD PRESSURE: 84 MMHG | SYSTOLIC BLOOD PRESSURE: 129 MMHG | HEART RATE: 88 BPM | BODY MASS INDEX: 52.48 KG/M2 | RESPIRATION RATE: 18 BRPM

## 2025-04-10 DIAGNOSIS — E66.01 CLASS 3 SEVERE OBESITY DUE TO EXCESS CALORIES WITH SERIOUS COMORBIDITY AND BODY MASS INDEX (BMI) OF 60.0 TO 69.9 IN ADULT: ICD-10-CM

## 2025-04-10 DIAGNOSIS — E11.65 TYPE 2 DIABETES MELLITUS WITH HYPERGLYCEMIA, WITHOUT LONG-TERM CURRENT USE OF INSULIN: ICD-10-CM

## 2025-04-10 DIAGNOSIS — E78.2 MIXED HYPERLIPIDEMIA: ICD-10-CM

## 2025-04-10 DIAGNOSIS — E66.813 CLASS 3 SEVERE OBESITY DUE TO EXCESS CALORIES WITH SERIOUS COMORBIDITY AND BODY MASS INDEX (BMI) OF 60.0 TO 69.9 IN ADULT: ICD-10-CM

## 2025-04-10 DIAGNOSIS — I10 HYPERTENSION, UNSPECIFIED TYPE: ICD-10-CM

## 2025-04-10 DIAGNOSIS — Z00.00 WELL ADULT EXAM: ICD-10-CM

## 2025-04-10 DIAGNOSIS — E11.65 TYPE 2 DIABETES MELLITUS WITH HYPERGLYCEMIA, WITHOUT LONG-TERM CURRENT USE OF INSULIN: Primary | ICD-10-CM

## 2025-04-10 DIAGNOSIS — L98.9 SKIN LESION: ICD-10-CM

## 2025-04-10 PROBLEM — Z80.0 FAMILY HISTORY OF COLON CANCER: Status: RESOLVED | Noted: 2023-02-06 | Resolved: 2025-04-10

## 2025-04-10 LAB
25(OH)D3+25(OH)D2 SERPL-MCNC: 23 NG/ML (ref 30–80)
ALBUMIN SERPL-MCNC: 4 G/DL (ref 3.5–5)
ALBUMIN/GLOB SERPL: 1.1 RATIO (ref 1.1–2)
ALP SERPL-CCNC: 67 UNIT/L (ref 40–150)
ALT SERPL-CCNC: 38 UNIT/L (ref 0–55)
ANION GAP SERPL CALC-SCNC: 7 MEQ/L
AST SERPL-CCNC: 22 UNIT/L (ref 11–45)
BACTERIA #/AREA URNS AUTO: ABNORMAL /HPF
BASOPHILS # BLD AUTO: 0.05 X10(3)/MCL
BASOPHILS NFR BLD AUTO: 0.5 %
BILIRUB SERPL-MCNC: 0.5 MG/DL
BILIRUB UR QL STRIP.AUTO: NEGATIVE
BUN SERPL-MCNC: 14.8 MG/DL (ref 8.9–20.6)
CALCIUM SERPL-MCNC: 9.1 MG/DL (ref 8.4–10.2)
CHLORIDE SERPL-SCNC: 104 MMOL/L (ref 98–107)
CHOLEST SERPL-MCNC: 157 MG/DL
CHOLEST/HDLC SERPL: 4 {RATIO} (ref 0–5)
CLARITY UR: CLEAR
CO2 SERPL-SCNC: 29 MMOL/L (ref 22–29)
COLOR UR AUTO: ABNORMAL
CREAT SERPL-MCNC: 0.81 MG/DL (ref 0.72–1.25)
CREAT UR-MCNC: 174.3 MG/DL (ref 63–166)
CREAT/UREA NIT SERPL: 18
EOSINOPHIL # BLD AUTO: 0.1 X10(3)/MCL (ref 0–0.9)
EOSINOPHIL NFR BLD AUTO: 1.1 %
ERYTHROCYTE [DISTWIDTH] IN BLOOD BY AUTOMATED COUNT: 14.3 % (ref 11.5–17)
EST. AVERAGE GLUCOSE BLD GHB EST-MCNC: 139.9 MG/DL
GFR SERPLBLD CREATININE-BSD FMLA CKD-EPI: >60 ML/MIN/1.73/M2
GLOBULIN SER-MCNC: 3.6 GM/DL (ref 2.4–3.5)
GLUCOSE SERPL-MCNC: 135 MG/DL (ref 74–100)
GLUCOSE UR QL STRIP: NORMAL
HBA1C MFR BLD: 6.5 %
HCT VFR BLD AUTO: 47.8 % (ref 42–52)
HDLC SERPL-MCNC: 41 MG/DL (ref 35–60)
HGB BLD-MCNC: 15.1 G/DL (ref 14–18)
HGB UR QL STRIP: NEGATIVE
HYALINE CASTS #/AREA URNS LPF: ABNORMAL /LPF
IMM GRANULOCYTES # BLD AUTO: 0.03 X10(3)/MCL (ref 0–0.04)
IMM GRANULOCYTES NFR BLD AUTO: 0.3 %
KETONES UR QL STRIP: NEGATIVE
LDLC SERPL CALC-MCNC: 96 MG/DL (ref 50–140)
LEUKOCYTE ESTERASE UR QL STRIP: NEGATIVE
LYMPHOCYTES # BLD AUTO: 2.39 X10(3)/MCL (ref 0.6–4.6)
LYMPHOCYTES NFR BLD AUTO: 26 %
MCH RBC QN AUTO: 29 PG (ref 27–31)
MCHC RBC AUTO-ENTMCNC: 31.6 G/DL (ref 33–36)
MCV RBC AUTO: 91.9 FL (ref 80–94)
MICROALBUMIN UR-MCNC: 40.9 UG/ML
MICROALBUMIN/CREAT RATIO PNL UR: 23.5 MG/GM CR (ref 0–30)
MONOCYTES # BLD AUTO: 1.01 X10(3)/MCL (ref 0.1–1.3)
MONOCYTES NFR BLD AUTO: 11 %
MUCOUS THREADS URNS QL MICRO: ABNORMAL /LPF
NEUTROPHILS # BLD AUTO: 5.61 X10(3)/MCL (ref 2.1–9.2)
NEUTROPHILS NFR BLD AUTO: 61.1 %
NITRITE UR QL STRIP: NEGATIVE
NRBC BLD AUTO-RTO: 0 %
PH UR STRIP: 6 [PH]
PLATELET # BLD AUTO: 427 X10(3)/MCL (ref 130–400)
PMV BLD AUTO: 9.5 FL (ref 7.4–10.4)
POTASSIUM SERPL-SCNC: 4.2 MMOL/L (ref 3.5–5.1)
PROT SERPL-MCNC: 7.6 GM/DL (ref 6.4–8.3)
PROT UR QL STRIP: NEGATIVE
RBC # BLD AUTO: 5.2 X10(6)/MCL (ref 4.7–6.1)
RBC #/AREA URNS AUTO: ABNORMAL /HPF
SODIUM SERPL-SCNC: 140 MMOL/L (ref 136–145)
SP GR UR STRIP.AUTO: 1.02 (ref 1–1.03)
SQUAMOUS #/AREA URNS LPF: ABNORMAL /HPF
T4 FREE SERPL-MCNC: 0.87 NG/DL (ref 0.7–1.48)
TRIGL SERPL-MCNC: 101 MG/DL (ref 34–140)
TSH SERPL-ACNC: 1.03 UIU/ML (ref 0.35–4.94)
UROBILINOGEN UR STRIP-ACNC: NORMAL
VLDLC SERPL CALC-MCNC: 20 MG/DL
WBC # BLD AUTO: 9.19 X10(3)/MCL (ref 4.5–11.5)
WBC #/AREA URNS AUTO: ABNORMAL /HPF

## 2025-04-10 PROCEDURE — 83036 HEMOGLOBIN GLYCOSYLATED A1C: CPT

## 2025-04-10 PROCEDURE — 84443 ASSAY THYROID STIM HORMONE: CPT

## 2025-04-10 PROCEDURE — 80061 LIPID PANEL: CPT

## 2025-04-10 PROCEDURE — 99215 OFFICE O/P EST HI 40 MIN: CPT | Mod: PBBFAC

## 2025-04-10 PROCEDURE — 85025 COMPLETE CBC W/AUTO DIFF WBC: CPT

## 2025-04-10 PROCEDURE — 92228 IMG RTA DETC/MNTR DS PHY/QHP: CPT | Mod: TC,PBBFAC | Performed by: FAMILY MEDICINE

## 2025-04-10 PROCEDURE — 80053 COMPREHEN METABOLIC PANEL: CPT

## 2025-04-10 PROCEDURE — 81001 URINALYSIS AUTO W/SCOPE: CPT

## 2025-04-10 PROCEDURE — 82043 UR ALBUMIN QUANTITATIVE: CPT

## 2025-04-10 PROCEDURE — 36415 COLL VENOUS BLD VENIPUNCTURE: CPT

## 2025-04-10 PROCEDURE — 92228 IMG RTA DETC/MNTR DS PHY/QHP: CPT | Mod: PBBFAC

## 2025-04-10 PROCEDURE — 84439 ASSAY OF FREE THYROXINE: CPT

## 2025-04-10 PROCEDURE — 82306 VITAMIN D 25 HYDROXY: CPT

## 2025-04-10 NOTE — ASSESSMENT & PLAN NOTE
At goal without medication  Low Sodium Diet (DASH Diet - Less than 2 grams of sodium per day).  Monitor blood pressure daily and log. Report consistent numbers greater than 140/90.  Maintain healthy weight with goal BMI <30. Exercise 30 minutes per day, 5 days per week.

## 2025-04-10 NOTE — ASSESSMENT & PLAN NOTE
A1C ordered  Continue ozempic 2mg  Follow ADA Diet. Avoid soda, simple sweets, and limit rice/pasta/breads/starches (no more than 45-50 grams per meal).  Maintain healthy weight with goal BMI <30.  Exercise 5 times per week for 30 minutes per day.  Stressed importance of daily foot exams.  Stressed importance of annual dilated eye exam.

## 2025-04-10 NOTE — ASSESSMENT & PLAN NOTE

## 2025-04-10 NOTE — ASSESSMENT & PLAN NOTE
FLP ordered, no current medication  Stressed importance of dietary modifications. Follow a low cholesterol, low saturated fat diet with less that 200mg of cholesterol a day.  Avoid fried foods and high saturated fats (high saturated fats less than 7% of calories).  Add Flax Seed/Fish Oil supplements to diet. Increase dietary fiber.  Regular exercise can reduce LDL and raise HDL. Stressed importance of physical activity 5 times per week for 30 minutes per day.

## 2025-04-10 NOTE — PROGRESS NOTES
Labs reviewed, will discuss results with patient at their upcoming appointment.     HERACLIO Calderon

## 2025-04-11 ENCOUNTER — OFFICE VISIT (OUTPATIENT)
Dept: INTERNAL MEDICINE | Facility: CLINIC | Age: 45
End: 2025-04-11
Payer: MEDICAID

## 2025-04-11 DIAGNOSIS — I10 PRIMARY HYPERTENSION: ICD-10-CM

## 2025-04-11 DIAGNOSIS — E11.65 TYPE 2 DIABETES MELLITUS WITH HYPERGLYCEMIA, WITHOUT LONG-TERM CURRENT USE OF INSULIN: Primary | ICD-10-CM

## 2025-04-11 DIAGNOSIS — E55.9 VITAMIN D DEFICIENCY: ICD-10-CM

## 2025-04-11 DIAGNOSIS — E78.5 HYPERLIPIDEMIA, UNSPECIFIED HYPERLIPIDEMIA TYPE: ICD-10-CM

## 2025-04-11 RX ORDER — SEMAGLUTIDE 2.68 MG/ML
2 INJECTION, SOLUTION SUBCUTANEOUS WEEKLY
Qty: 3 ML | Refills: 6 | Status: SHIPPED | OUTPATIENT
Start: 2025-04-11

## 2025-04-11 RX ORDER — ATORVASTATIN CALCIUM 10 MG/1
10 TABLET, FILM COATED ORAL NIGHTLY
Qty: 90 TABLET | Refills: 2 | Status: SHIPPED | OUTPATIENT
Start: 2025-04-11

## 2025-04-11 RX ORDER — ERGOCALCIFEROL 1.25 MG/1
50000 CAPSULE ORAL
Qty: 4 CAPSULE | Refills: 2 | Status: SHIPPED | OUTPATIENT
Start: 2025-04-11

## 2025-04-11 NOTE — PROGRESS NOTES
Karena Lam, HERACLIO   [facility]  [address] [facility] [address]      PATIENT NAME: Dimas Sales  : 1980  DATE: 25  MRN: [id number]      Reason for Visit / Chief Complaint: Diabetes and Hyperlipidemia (Lab results)       History of Present Illness / Problem Focused Workflow     Dimas Sales presents to the clinic with Diabetes and Hyperlipidemia (Lab results)     43 yo male presents to clinic. Medical problems include HTN, DM, HLD, obesity.     4/10/25  Pt presents to establish care, he was previously followed by Carl SNYDER with LOV 2024. Last A1C was 6.6 at the time of last visit. Labs incomplete. He is reporting he is not currently taking any of his medications other than ozempic due to not feeling like he needs it. He is fasting and agreeable to complete labs today with virtual follow up tomorrow. Encouraged him to monitor BP at home and report any readings >140/90, currently at goal without medication. He is asking about adipex for weight loss and energy, reviewed I do not recommend as he is currently not making weight loss efforts with diet and exercise. He reports understanding. Agreeable to DM eye and foot exam.     25  Pt presents for DM and HLD lab review. A1C 6.5, LDL 96. Labs overall stable and unremarkable. Did review recommendation to resume taking statin to reduce risk, he is agreeable to do so. Vitamin D low, agreeable to rx followed by OTC supplement. Reinforced adequate hydration and monitoring blood pressure at home. Denies complaints. RTC 6 months for HTN and DM follow up with POC A1C          Review of Systems     Review of Systems   Constitutional:  Negative for fatigue, fever and unexpected weight change.   HENT:  Negative for ear pain, hearing loss, trouble swallowing and voice change.    Respiratory:  Negative for cough and shortness of breath.    Cardiovascular:  Negative for chest pain  "and palpitations.   Gastrointestinal:  Negative for abdominal pain, diarrhea and vomiting.   Genitourinary:  Negative for dysuria.   Musculoskeletal:  Negative for gait problem.   Skin:  Negative for rash and wound.   Neurological:  Negative for weakness.   Psychiatric/Behavioral:  Negative for suicidal ideas.          Medications and Allergies     Medications  Medication List with Changes/Refills   New Medications    ERGOCALCIFEROL (ERGOCALCIFEROL) 50,000 UNIT CAP    Take 1 capsule (50,000 Units total) by mouth every 7 days. For Low Vitamin D. Start over the counter once completed.   Changed and/or Refilled Medications    Modified Medication Previous Medication    ATORVASTATIN (LIPITOR) 10 MG TABLET atorvastatin (LIPITOR) 10 MG tablet       Take 1 tablet (10 mg total) by mouth every evening.    Take 1 tablet (10 mg total) by mouth every evening.    SEMAGLUTIDE (OZEMPIC) 2 MG/DOSE (8 MG/3 ML) PNIJ semaglutide (OZEMPIC) 2 mg/dose (8 mg/3 mL) PnIj       Inject 2 mg into the skin once a week.    Inject 2 mg into the skin once a week.   Discontinued Medications    ASPIRIN (ECOTRIN) 81 MG EC TABLET    Take 81 mg by mouth Daily.    BD ULTRA-FINE MICRO PEN NEEDLE 32 GAUGE X 1/4" NDLE    Inject 1 applicator into the skin 2 (two) times a day.    BLOOD SUGAR DIAGNOSTIC (ONETOUCH ULTRA TEST) STRP    USE 1 STRIP TO CHECK GLUCOSE ONCE DAILY TO  CHECK  BLOOD  SUGAR    LISINOPRIL (PRINIVIL,ZESTRIL) 20 MG TABLET    Take 1 tablet (20 mg total) by mouth once daily.    LORATADINE (CLARITIN) 10 MG TABLET    Take 1 tablet (10 mg total) by mouth Daily.    ONETOUCH DELICA PLUS LANCET 30 GAUGE [facility]    USE 1 LANCET ONCE DAILY    ONETOUCH ULTRA2 METER MISC    SMARTSIG:Via Meter Daily         Allergies  Review of patient's allergies indicates:  No Known Allergies    Physical Examination   There were no vitals filed for this visit.  Physical Exam  HENT:      Right Ear: Hearing normal.      Left Ear: Hearing normal.   Neurological:      Mental " Status: He is alert and oriented to person, place, and time.   Psychiatric:         Mood and Affect: Mood normal.           Results     Lab Results   Component Value Date    WBC 9.19 04/10/2025    RBC 5.20 04/10/2025    HGB 15.1 04/10/2025    HCT 47.8 04/10/2025    MCV 91.9 04/10/2025    MCH 29.0 04/10/2025    MCHC 31.6 (L) 04/10/2025    RDW 14.3 04/10/2025     (H) 04/10/2025    MPV 9.5 04/10/2025     Sodium   Date Value Ref Range Status   04/10/2025 140 136 - 145 mmol/L Final     Potassium   Date Value Ref Range Status   04/10/2025 4.2 3.5 - 5.1 mmol/L Final     Chloride   Date Value Ref Range Status   04/10/2025 104 98 - 107 mmol/L Final     CO2   Date Value Ref Range Status   04/10/2025 29 22 - 29 mmol/L Final     Blood Urea Nitrogen   Date Value Ref Range Status   04/10/2025 14.8 8.9 - 20.6 mg/dL Final     Creatinine   Date Value Ref Range Status   04/10/2025 0.81 0.72 - 1.25 mg/dL Final     Calcium   Date Value Ref Range Status   04/10/2025 9.1 8.4 - 10.2 mg/dL Final     Albumin   Date Value Ref Range Status   04/10/2025 4.0 3.5 - 5.0 g/dL Final     Bilirubin Total   Date Value Ref Range Status   04/10/2025 0.5 <=1.5 mg/dL Final     ALP   Date Value Ref Range Status   04/10/2025 67 40 - 150 unit/L Final     AST   Date Value Ref Range Status   04/10/2025 22 11 - 45 unit/L Final     ALT   Date Value Ref Range Status   04/10/2025 38 0 - 55 unit/L Final     Estimated GFR-Non    Date Value Ref Range Status   07/18/2022 >60 mls/min/1.73/m2 Final     Lab Results   Component Value Date    CHOL 157 04/10/2025     Lab Results   Component Value Date    HDL 41 04/10/2025     Lab Results   Component Value Date    TRIG 101 04/10/2025     Lab Results   Component Value Date    VLDL 20 04/10/2025     Lab Results   Component Value Date    LDL 96.00 04/10/2025     Lab Results   Component Value Date    TSH 1.033 04/10/2025     Lab Results   Component Value Date    PHUR 6.0 04/10/2025    PROTEINUA Negative  04/10/2025    GLUCUA Normal 04/10/2025    KETONESU Negative 06/11/2021    OCCULTUA Negative 04/10/2025    NITRITE Negative 04/10/2025    LEUKOCYTESUR Negative 04/10/2025     Lab Results   Component Value Date    HGBA1C 6.5 04/10/2025    HGBA1C 6.6 06/11/2024    HGBA1C 6.9 11/16/2023           Assessment         ICD-10-CM ICD-9-CM   1. Type 2 diabetes mellitus with hyperglycemia, without long-term current use of insulin  E11.65 250.00     790.29   2. Vitamin D deficiency  E55.9 268.9   3. Primary hypertension  I10 401.9   4. Hyperlipidemia, unspecified hyperlipidemia type  E78.5 272.4       Plan      Problem List Items Addressed This Visit       Hyperlipidemia    Current Assessment & Plan   FLP at goal, LDL 96  Resume atorvastatin 10mg QHS per guidelines  Stressed importance of dietary modifications. Follow a low cholesterol, low saturated fat diet with less that 200mg of cholesterol a day.  Avoid fried foods and high saturated fats (high saturated fats less than 7% of calories).  Add Flax Seed/Fish Oil supplements to diet. Increase dietary fiber.  Regular exercise can reduce LDL and raise HDL. Stressed importance of physical activity 5 times per week for 30 minutes per day.            Relevant Medications    atorvastatin (LIPITOR) 10 MG tablet    Hypertension    Current Assessment & Plan   At goal without medication   Previously on lisinopril 20mg  Low Sodium Diet (DASH Diet - Less than 2 grams of sodium per day).  Monitor blood pressure daily and log. Report consistent numbers greater than 140/90.  Maintain healthy weight with goal BMI <30. Exercise 30 minutes per day, 5 days per week.         Type 2 diabetes mellitus with hyperglycemia - Primary    Current Assessment & Plan   A1C 6.5- at goal  Continue ozempic 2mg  Follow ADA Diet. Avoid soda, simple sweets, and limit rice/pasta/breads/starches (no more than 45-50 grams per meal).  Maintain healthy weight with goal BMI <30.  Exercise 5 times per week for 30  minutes per day.  Stressed importance of daily foot exams.  Stressed importance of annual dilated eye exam.           Relevant Medications    semaglutide (OZEMPIC) 2 mg/dose (8 mg/3 mL) PnIj    Vitamin D deficiency    Current Assessment & Plan   Educated on increasing foods high in Vitamin D such as fish oil, cod liver oil, salmon, milk fortified with vitamin D.  RX Vitamin D3 68764 IU weekly x 12 weeks.  Complete entire 12 weeks of Vitamin D prescription.  After completion of prescription (12 weeks/3 months), begin taking Vitamin D 2000 I.U. tablets daily (purchase over the counter).  Repeat Vitamin D level as ordered.    Lab Results   Component Value Date    [id number] 23 (L) 04/10/2025              Relevant Medications    ergocalciferol (ERGOCALCIFEROL) 50,000 unit Cap       No future appointments.     The patient location is: work   The chief complaint leading to consultation is: DM and  HLD    Visit type: audiovisual    Face to Face time with patient: 12  16 minutes of total time spent on the encounter, which includes face to face time and non-face to face time preparing to see the patient (eg, review of tests), Obtaining and/or reviewing separately obtained history, Documenting clinical information in the electronic or other health record, Independently interpreting results (not separately reported) and communicating results to the patient/family/caregiver, or Care coordination (not separately reported).         Each patient to whom he or she provides medical services by telemedicine is:  (1) informed of the relationship between the physician and patient and the respective role of any other health care provider with respect to management of the patient; and (2) notified that he or she may decline to receive medical services by telemedicine and may withdraw from such care at any time.    Notes:       Signature:      [facility]  [address]  [place] [address]    Date of encounter: 4/11/25

## 2025-04-11 NOTE — ASSESSMENT & PLAN NOTE
Educated on increasing foods high in Vitamin D such as fish oil, cod liver oil, salmon, milk fortified with vitamin D.  RX Vitamin D3 24716 IU weekly x 12 weeks.  Complete entire 12 weeks of Vitamin D prescription.  After completion of prescription (12 weeks/3 months), begin taking Vitamin D 2000 I.U. tablets daily (purchase over the counter).  Repeat Vitamin D level as ordered.    Lab Results   Component Value Date    [id number] 23 (L) 04/10/2025

## 2025-04-11 NOTE — ASSESSMENT & PLAN NOTE
A1C 6.5- at goal  Continue ozempic 2mg  Follow ADA Diet. Avoid soda, simple sweets, and limit rice/pasta/breads/starches (no more than 45-50 grams per meal).  Maintain healthy weight with goal BMI <30.  Exercise 5 times per week for 30 minutes per day.  Stressed importance of daily foot exams.  Stressed importance of annual dilated eye exam.

## 2025-04-11 NOTE — ASSESSMENT & PLAN NOTE
FLP at goal, LDL 96  Resume atorvastatin 10mg QHS per guidelines  Stressed importance of dietary modifications. Follow a low cholesterol, low saturated fat diet with less that 200mg of cholesterol a day.  Avoid fried foods and high saturated fats (high saturated fats less than 7% of calories).  Add Flax Seed/Fish Oil supplements to diet. Increase dietary fiber.  Regular exercise can reduce LDL and raise HDL. Stressed importance of physical activity 5 times per week for 30 minutes per day.

## 2025-04-11 NOTE — ASSESSMENT & PLAN NOTE
At goal without medication   Previously on lisinopril 20mg  Low Sodium Diet (DASH Diet - Less than 2 grams of sodium per day).  Monitor blood pressure daily and log. Report consistent numbers greater than 140/90.  Maintain healthy weight with goal BMI <30. Exercise 30 minutes per day, 5 days per week.

## 2025-08-29 ENCOUNTER — OFFICE VISIT (OUTPATIENT)
Dept: FAMILY MEDICINE | Facility: CLINIC | Age: 45
End: 2025-08-29
Payer: MEDICAID

## 2025-08-29 VITALS
HEIGHT: 65 IN | WEIGHT: 315 LBS | HEART RATE: 72 BPM | RESPIRATION RATE: 18 BRPM | DIASTOLIC BLOOD PRESSURE: 78 MMHG | TEMPERATURE: 98 F | BODY MASS INDEX: 52.48 KG/M2 | SYSTOLIC BLOOD PRESSURE: 124 MMHG | OXYGEN SATURATION: 99 %

## 2025-08-29 DIAGNOSIS — L91.8 SKIN TAG: ICD-10-CM

## 2025-08-29 DIAGNOSIS — L98.9 SKIN LESION OF FACE: Primary | ICD-10-CM

## 2025-08-29 PROCEDURE — 99214 OFFICE O/P EST MOD 30 MIN: CPT | Mod: PBBFAC
